# Patient Record
Sex: FEMALE | Race: BLACK OR AFRICAN AMERICAN | NOT HISPANIC OR LATINO
[De-identification: names, ages, dates, MRNs, and addresses within clinical notes are randomized per-mention and may not be internally consistent; named-entity substitution may affect disease eponyms.]

---

## 2018-10-18 ENCOUNTER — APPOINTMENT (OUTPATIENT)
Dept: SPINE | Facility: CLINIC | Age: 63
End: 2018-10-18
Payer: COMMERCIAL

## 2018-10-18 VITALS
WEIGHT: 139 LBS | HEIGHT: 67.5 IN | BODY MASS INDEX: 21.56 KG/M2 | SYSTOLIC BLOOD PRESSURE: 102 MMHG | DIASTOLIC BLOOD PRESSURE: 60 MMHG

## 2018-10-18 DIAGNOSIS — Z87.39 PERSONAL HISTORY OF OTHER DISEASES OF THE MUSCULOSKELETAL SYSTEM AND CONNECTIVE TISSUE: ICD-10-CM

## 2018-10-18 DIAGNOSIS — Z83.3 FAMILY HISTORY OF DIABETES MELLITUS: ICD-10-CM

## 2018-10-18 DIAGNOSIS — Z80.3 FAMILY HISTORY OF MALIGNANT NEOPLASM OF BREAST: ICD-10-CM

## 2018-10-18 DIAGNOSIS — Z80.49 FAMILY HISTORY OF MALIGNANT NEOPLASM OF OTHER GENITAL ORGANS: ICD-10-CM

## 2018-10-18 DIAGNOSIS — M25.78 OSTEOPHYTE, VERTEBRAE: ICD-10-CM

## 2018-10-18 DIAGNOSIS — Z82.49 FAMILY HISTORY OF ISCHEMIC HEART DISEASE AND OTHER DISEASES OF THE CIRCULATORY SYSTEM: ICD-10-CM

## 2018-10-18 PROCEDURE — 99203 OFFICE O/P NEW LOW 30 MIN: CPT

## 2018-10-18 RX ORDER — METOPROLOL SUCCINATE 200 MG/1
TABLET, EXTENDED RELEASE ORAL
Refills: 0 | Status: ACTIVE | COMMUNITY

## 2018-10-18 RX ORDER — ASPIRIN 81 MG
81 TABLET, DELAYED RELEASE (ENTERIC COATED) ORAL
Refills: 0 | Status: ACTIVE | COMMUNITY

## 2019-10-24 ENCOUNTER — APPOINTMENT (OUTPATIENT)
Dept: OTOLARYNGOLOGY | Facility: CLINIC | Age: 64
End: 2019-10-24
Payer: MEDICAID

## 2019-10-24 ENCOUNTER — OUTPATIENT (OUTPATIENT)
Dept: OUTPATIENT SERVICES | Facility: HOSPITAL | Age: 64
LOS: 1 days | Discharge: HOME | End: 2019-10-24

## 2019-10-24 VITALS
DIASTOLIC BLOOD PRESSURE: 71 MMHG | HEIGHT: 67.5 IN | WEIGHT: 185 LBS | BODY MASS INDEX: 28.7 KG/M2 | SYSTOLIC BLOOD PRESSURE: 113 MMHG | HEART RATE: 101 BPM

## 2019-10-24 DIAGNOSIS — Z00.00 ENCOUNTER FOR GENERAL ADULT MEDICAL EXAMINATION W/OUT ABNORMAL FINDINGS: ICD-10-CM

## 2019-10-24 DIAGNOSIS — Z87.891 PERSONAL HISTORY OF NICOTINE DEPENDENCE: ICD-10-CM

## 2019-10-24 DIAGNOSIS — Z87.09 PERSONAL HISTORY OF OTHER DISEASES OF THE RESPIRATORY SYSTEM: ICD-10-CM

## 2019-10-24 PROCEDURE — 92557 COMPREHENSIVE HEARING TEST: CPT

## 2019-10-24 PROCEDURE — 92567 TYMPANOMETRY: CPT

## 2019-10-24 PROCEDURE — 99204 OFFICE O/P NEW MOD 45 MIN: CPT | Mod: 25

## 2019-10-24 PROCEDURE — G0268 REMOVAL OF IMPACTED WAX MD: CPT

## 2019-10-24 RX ORDER — OMEPRAZOLE 20 MG/1
TABLET, DELAYED RELEASE ORAL
Refills: 0 | Status: ACTIVE | COMMUNITY

## 2019-10-24 RX ORDER — CHOLECALCIFEROL (VITAMIN D3) 25 MCG
TABLET ORAL
Refills: 0 | Status: ACTIVE | COMMUNITY

## 2019-10-24 RX ORDER — PNV NO.95/FERROUS FUM/FOLIC AC 28MG-0.8MG
TABLET ORAL
Refills: 0 | Status: ACTIVE | COMMUNITY

## 2019-10-24 RX ORDER — CHROMIUM 200 MCG
TABLET ORAL
Refills: 0 | Status: ACTIVE | COMMUNITY

## 2019-10-24 RX ORDER — ATORVASTATIN CALCIUM 80 MG/1
TABLET, FILM COATED ORAL
Refills: 0 | Status: DISCONTINUED | COMMUNITY
End: 2019-10-24

## 2019-10-24 NOTE — REASON FOR VISIT
[Initial Consultation] : an initial consultation for [Spouse] : spouse [FreeTextEntry2] : referred by an ENT MD from [facility] for bilateral otalgia, throbbing in both ears

## 2019-10-24 NOTE — HISTORY OF PRESENT ILLNESS
[de-identified] : 64 year old female referred by an ENT MD from [facility] for bilateral otalgia, throbbing in both ears.  States the right ear is worse than the left.  Feels like a pin sticking in it.  States has had the otalgia for the past 2 years, occurring only at night.  States has had intermittent dizziness for the past 3 months.  Patient denies otorrhea, ear infections, hearing loss, vertigo, headaches related to hearing.  States has had a sore throat mainly at night, for the past 2-3 years.   States dysphagia with saliva.  States a little odynophagia at night.  States every now and then has a roughness to her voice.  Taking omeprazole daily for the past 2 years.  \par

## 2019-10-24 NOTE — PHYSICAL EXAM
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

## 2019-10-24 NOTE — PROCEDURE
[FreeTextEntry3] : Procedure note:  Bilateral cerumenectomy\par \par Oct 24, 2019 \par \par Description of Procedure:   Bilateral cerumen impactions were noted requiring debridement under the operating microscope using otologic instrumentation.  The patient tolerated the procedure without complications.\par \par

## 2019-10-31 DIAGNOSIS — D44.7 NEOPLASM OF UNCERTAIN BEHAVIOR OF AORTIC BODY AND OTHER PARAGANGLIA: ICD-10-CM

## 2019-10-31 DIAGNOSIS — D23.21 OTHER BENIGN NEOPLASM OF SKIN OF RIGHT EAR AND EXTERNAL AURICULAR CANAL: ICD-10-CM

## 2019-10-31 DIAGNOSIS — H90.3 SENSORINEURAL HEARING LOSS, BILATERAL: ICD-10-CM

## 2019-10-31 DIAGNOSIS — H61.23 IMPACTED CERUMEN, BILATERAL: ICD-10-CM

## 2020-05-30 ENCOUNTER — APPOINTMENT (OUTPATIENT)
Dept: OTOLARYNGOLOGY | Facility: CLINIC | Age: 65
End: 2020-05-30
Payer: MEDICARE

## 2020-05-30 ENCOUNTER — OUTPATIENT (OUTPATIENT)
Dept: OUTPATIENT SERVICES | Facility: HOSPITAL | Age: 65
LOS: 1 days | Discharge: HOME | End: 2020-05-30

## 2020-05-30 VITALS
HEART RATE: 77 BPM | SYSTOLIC BLOOD PRESSURE: 110 MMHG | TEMPERATURE: 98.6 F | DIASTOLIC BLOOD PRESSURE: 68 MMHG | HEIGHT: 67.5 IN

## 2020-05-30 PROCEDURE — 92567 TYMPANOMETRY: CPT

## 2020-05-30 PROCEDURE — 92557 COMPREHENSIVE HEARING TEST: CPT

## 2020-05-30 PROCEDURE — 99214 OFFICE O/P EST MOD 30 MIN: CPT | Mod: 25

## 2020-05-30 PROCEDURE — 92504 EAR MICROSCOPY EXAMINATION: CPT

## 2020-05-30 RX ORDER — ESTRADIOL 0.1 MG/G
0.1 CREAM VAGINAL
Qty: 42 | Refills: 0 | Status: ACTIVE | COMMUNITY
Start: 2020-05-19

## 2020-05-30 RX ORDER — FLUTICASONE PROPIONATE 50 UG/1
50 SPRAY, METERED NASAL
Qty: 16 | Refills: 0 | Status: ACTIVE | COMMUNITY
Start: 2020-03-16

## 2020-05-30 RX ORDER — PREDNISONE 5 MG/1
5 TABLET ORAL
Qty: 30 | Refills: 0 | Status: ACTIVE | COMMUNITY
Start: 2020-05-22

## 2020-05-30 RX ORDER — ALBUTEROL SULFATE 90 UG/1
108 (90 BASE) INHALANT RESPIRATORY (INHALATION)
Qty: 8 | Refills: 0 | Status: ACTIVE | COMMUNITY
Start: 2020-03-17

## 2020-06-01 NOTE — DATA REVIEWED
[de-identified] : I personally reviewed the patient's audiogram, which shows mostly stable symmetric b/l SNHL, type A tymps b/l.\par

## 2020-06-01 NOTE — PHYSICAL EXAM
[de-identified] : narrow but patent b/l [Normal] : external ears are normal bilaterally [de-identified] : AD retrotympanic mass on promontory just anterior to malleus, appears more prominent relative to last visit; L TM intact without effusion/retraction

## 2020-06-01 NOTE — PROCEDURE
[FreeTextEntry3] : Procedure note:  Binocular microscopy\par \par May 30, 2020 \par \par Inspection of the ears was performed under binocular microscopy because of need to accurately visualize otologic landmarks and potential pathologic conditions that would not otherwise be visible through simple otoscopic exam.\par

## 2020-06-01 NOTE — CONSULT LETTER
[FreeTextEntry2] : Dwayne Houser MD [FreeTextEntry1] : Dear Nicola,\par \par Prosper David presents for followup for her right glomus tympanicum. As you may recall, she is a very pleasant 65-year-old woman with a 3 mm right glomus tympanicum. At her last visit with me in October she elected for observation of the tumor given she was mostly asymptomatic. Since then she has noticed an increase in right pulsatile tinnitus. Exam today demonstrates a similar-appearing right glomus tympanicum. Grossly it does appear slightly larger than the previously documented size of 3 mm. Given she now appeared more symptomatic and there is clinical concern for growth, we now discussed surgical removal, and she is interested in proceeding. I plan to obtain a new temporal bone CT to confirm no interval development of bony promontory erosion, and then she will followup after imaging for further discussion regarding the details of surgery.\par \par Thank you once again for the opportunity to participate in your patient's care, and I will keep you updated as to her progress.\par \par Best regards,\par \par Frandy Eden\par Otology/Neurotology\par Department of Otolaryngology\par [facility]\par [facility]\par

## 2020-06-01 NOTE — HISTORY OF PRESENT ILLNESS
[de-identified] : 65 year old female follow up for right glomus tumor and pulsatile tinnitus.  Reports more bothersome right tinnitus over the past 6 months since her last visit, pulsatile in nature, present everyday, fluctuates in severity.  Also reports left ear throbbing pain, mainly at night, usually occurs daily. Reports severe pain leads to a headache. Reports occasional left tinnitus non fluctuating. Seen in Urgent Care 3 months ago, Qtip stuck in left ear, removed and given ear drops. Denies drainage, dizziness or changes in hearing.

## 2020-06-01 NOTE — REASON FOR VISIT
[Subsequent Evaluation] : a subsequent evaluation for [Other: _____] : [unfilled] [FreeTextEntry2] : tinnitus

## 2020-06-09 ENCOUNTER — APPOINTMENT (OUTPATIENT)
Dept: CT IMAGING | Facility: IMAGING CENTER | Age: 65
End: 2020-06-09
Payer: MEDICARE

## 2020-06-09 ENCOUNTER — OUTPATIENT (OUTPATIENT)
Dept: OUTPATIENT SERVICES | Facility: HOSPITAL | Age: 65
LOS: 1 days | End: 2020-06-09
Payer: COMMERCIAL

## 2020-06-09 ENCOUNTER — RESULT REVIEW (OUTPATIENT)
Age: 65
End: 2020-06-09

## 2020-06-09 ENCOUNTER — APPOINTMENT (OUTPATIENT)
Dept: OTOLARYNGOLOGY | Facility: CLINIC | Age: 65
End: 2020-06-09
Payer: MEDICAID

## 2020-06-09 DIAGNOSIS — D23.21 OTHER BENIGN NEOPLASM OF SKIN OF RIGHT EAR AND EXTERNAL AURICULAR CANAL: ICD-10-CM

## 2020-06-09 DIAGNOSIS — D44.7 NEOPLASM OF UNCERTAIN BEHAVIOR OF AORTIC BODY AND OTHER PARAGANGLIA: ICD-10-CM

## 2020-06-09 PROCEDURE — 70480 CT ORBIT/EAR/FOSSA W/O DYE: CPT | Mod: 26

## 2020-06-09 PROCEDURE — ZZZZZ: CPT

## 2020-06-09 PROCEDURE — 70480 CT ORBIT/EAR/FOSSA W/O DYE: CPT

## 2020-06-11 DIAGNOSIS — D23.21 OTHER BENIGN NEOPLASM OF SKIN OF RIGHT EAR AND EXTERNAL AURICULAR CANAL: ICD-10-CM

## 2020-06-11 DIAGNOSIS — H90.3 SENSORINEURAL HEARING LOSS, BILATERAL: ICD-10-CM

## 2020-06-11 DIAGNOSIS — D44.7 NEOPLASM OF UNCERTAIN BEHAVIOR OF AORTIC BODY AND OTHER PARAGANGLIA: ICD-10-CM

## 2020-06-11 DIAGNOSIS — H61.23 IMPACTED CERUMEN, BILATERAL: ICD-10-CM

## 2020-06-18 ENCOUNTER — NON-APPOINTMENT (OUTPATIENT)
Age: 65
End: 2020-06-18

## 2020-06-18 DIAGNOSIS — H92.03 OTALGIA, BILATERAL: ICD-10-CM

## 2020-07-28 ENCOUNTER — OUTPATIENT (OUTPATIENT)
Dept: OUTPATIENT SERVICES | Facility: HOSPITAL | Age: 65
LOS: 1 days | End: 2020-07-28
Payer: MEDICARE

## 2020-07-28 VITALS
RESPIRATION RATE: 18 BRPM | OXYGEN SATURATION: 96 % | SYSTOLIC BLOOD PRESSURE: 90 MMHG | WEIGHT: 145.06 LBS | HEIGHT: 66 IN | HEART RATE: 86 BPM | TEMPERATURE: 98 F | DIASTOLIC BLOOD PRESSURE: 60 MMHG

## 2020-07-28 DIAGNOSIS — G43.909 MIGRAINE, UNSPECIFIED, NOT INTRACTABLE, WITHOUT STATUS MIGRAINOSUS: ICD-10-CM

## 2020-07-28 DIAGNOSIS — H90.3 SENSORINEURAL HEARING LOSS, BILATERAL: ICD-10-CM

## 2020-07-28 DIAGNOSIS — Z98.890 OTHER SPECIFIED POSTPROCEDURAL STATES: Chronic | ICD-10-CM

## 2020-07-28 DIAGNOSIS — D23.9 OTHER BENIGN NEOPLASM OF SKIN, UNSPECIFIED: ICD-10-CM

## 2020-07-28 DIAGNOSIS — Z86.79 PERSONAL HISTORY OF OTHER DISEASES OF THE CIRCULATORY SYSTEM: ICD-10-CM

## 2020-07-28 DIAGNOSIS — Z90.710 ACQUIRED ABSENCE OF BOTH CERVIX AND UTERUS: Chronic | ICD-10-CM

## 2020-07-28 DIAGNOSIS — R06.00 DYSPNEA, UNSPECIFIED: ICD-10-CM

## 2020-07-28 LAB
ANION GAP SERPL CALC-SCNC: 12 MMO/L — SIGNIFICANT CHANGE UP (ref 7–14)
BUN SERPL-MCNC: 15 MG/DL — SIGNIFICANT CHANGE UP (ref 7–23)
CALCIUM SERPL-MCNC: 9.3 MG/DL — SIGNIFICANT CHANGE UP (ref 8.4–10.5)
CHLORIDE SERPL-SCNC: 108 MMOL/L — HIGH (ref 98–107)
CO2 SERPL-SCNC: 21 MMOL/L — LOW (ref 22–31)
CREAT SERPL-MCNC: 0.6 MG/DL — SIGNIFICANT CHANGE UP (ref 0.5–1.3)
GLUCOSE SERPL-MCNC: 81 MG/DL — SIGNIFICANT CHANGE UP (ref 70–99)
HCT VFR BLD CALC: 27.9 % — LOW (ref 34.5–45)
HGB BLD-MCNC: 8.5 G/DL — LOW (ref 11.5–15.5)
MCHC RBC-ENTMCNC: 20.2 PG — LOW (ref 27–34)
MCHC RBC-ENTMCNC: 30.5 % — LOW (ref 32–36)
MCV RBC AUTO: 66.4 FL — LOW (ref 80–100)
NRBC # FLD: 0 K/UL — SIGNIFICANT CHANGE UP (ref 0–0)
PLATELET # BLD AUTO: 466 K/UL — HIGH (ref 150–400)
PMV BLD: 9.8 FL — SIGNIFICANT CHANGE UP (ref 7–13)
POTASSIUM SERPL-MCNC: 3.8 MMOL/L — SIGNIFICANT CHANGE UP (ref 3.5–5.3)
POTASSIUM SERPL-SCNC: 3.8 MMOL/L — SIGNIFICANT CHANGE UP (ref 3.5–5.3)
RBC # BLD: 4.2 M/UL — SIGNIFICANT CHANGE UP (ref 3.8–5.2)
RBC # FLD: 18.4 % — HIGH (ref 10.3–14.5)
SODIUM SERPL-SCNC: 141 MMOL/L — SIGNIFICANT CHANGE UP (ref 135–145)
WBC # BLD: 7.76 K/UL — SIGNIFICANT CHANGE UP (ref 3.8–10.5)
WBC # FLD AUTO: 7.76 K/UL — SIGNIFICANT CHANGE UP (ref 3.8–10.5)

## 2020-07-28 PROCEDURE — 93010 ELECTROCARDIOGRAM REPORT: CPT

## 2020-07-28 NOTE — H&P PST ADULT - NSICDXPASTMEDICALHX_GEN_ALL_CORE_FT
PAST MEDICAL HISTORY:  Atrophic vaginitis     GERD (gastroesophageal reflux disease)     History of cardiac arrhythmia     History of hypotension     Migraines     Osteoporosis     Seasonal allergies

## 2020-07-28 NOTE — H&P PST ADULT - ASSESSMENT
64 yo female with history of OA, osteoporosis presents to PST unit with pre-op diagnosis of other benign neoplasm of skin of right ear and external auricular canal scheduled for tympanoplasty, removal of glomus tympanicum, tissue graft with Dr. Eden on 08/05/2020.

## 2020-07-28 NOTE — H&P PST ADULT - NSICDXPROBLEM_GEN_ALL_CORE_FT
PROBLEM DIAGNOSES  Problem: Other benign neoplasm of skin  Assessment and Plan: scheduled for tympanoplasty, removal of glomus tympanicum, tissue graft with Dr. Eden on 08/05/2020.  Verbal and written pre-op instructions provided to patient. Patient verbalized understanding and will call surgeons office for revised instructions if surgery is rescheduled.   Pepcid for GI prophylaxis provided.   Patient given phone number [phone]  to schedule COVID testing within 72 hours of procedure.     Problem: Migraines  Assessment and Plan: Pt. instructed to continue medications as prescribed.     Problem: History of cardiac arrhythmia  Assessment and Plan: Pt. instructed to continue medications as prescribed.     Problem: KEITA (dyspnea on exertion)  Assessment and Plan: Requesting Cardiac eval -copy requested.

## 2020-07-28 NOTE — H&P PST ADULT - NSANTHOSAYNRD_GEN_A_CORE
No. TRACEE screening performed.  STOP BANG Legend: 0-2 = LOW Risk; 3-4 = INTERMEDIATE Risk; 5-8 = HIGH Risk

## 2020-07-28 NOTE — H&P PST ADULT - HISTORY OF PRESENT ILLNESS
66 yo female with history of OA, osteoporosis presents to PST unit with pre-op diagnosis of other benign neoplasm of skin of right ear and external auricular canal scheduled for tympanoplasty, removal of glomus tympanicum, tissue graft with Dr. Eden on 08/05/2020. She reports tinnitus in right ear over two years which prompted ENT evaluation and found to have enlarging benign tumor in right ear.     ** Pt is poor historian**

## 2020-07-28 NOTE — H&P PST ADULT - ENMT COMMENTS
occasional pinching sensation/ pain in left ear, pre-op diagnosis other benign neoplasm of skin of right ear and external auricular canal pre-op diagnosis of other benign neoplasm of skin of right ear and external auricular canal

## 2020-07-28 NOTE — H&P PST ADULT - ENT GEN HX ROS MEA POS PC
irritation of throat and occasional difficulty swallowing evaluated by endoscopy -unsure when/tinnitus/dysphagia/ear pain

## 2020-07-28 NOTE — H&P PST ADULT - RS GEN PE MLT RESP DETAILS PC
respirations non-labored/breath sounds equal/airway patent/clear to auscultation bilaterally/no wheezes/good air movement

## 2020-08-01 DIAGNOSIS — Z01.818 ENCOUNTER FOR OTHER PREPROCEDURAL EXAMINATION: ICD-10-CM

## 2020-08-02 ENCOUNTER — APPOINTMENT (OUTPATIENT)
Dept: DISASTER EMERGENCY | Facility: CLINIC | Age: 65
End: 2020-08-02

## 2020-08-03 LAB — SARS-COV-2 N GENE NPH QL NAA+PROBE: NOT DETECTED

## 2020-08-04 ENCOUNTER — TRANSCRIPTION ENCOUNTER (OUTPATIENT)
Age: 65
End: 2020-08-04

## 2020-08-05 ENCOUNTER — OUTPATIENT (OUTPATIENT)
Dept: OUTPATIENT SERVICES | Facility: HOSPITAL | Age: 65
LOS: 1 days | Discharge: HOME | End: 2020-08-05
Payer: MEDICARE

## 2020-08-05 ENCOUNTER — RESULT REVIEW (OUTPATIENT)
Age: 65
End: 2020-08-05

## 2020-08-05 ENCOUNTER — APPOINTMENT (OUTPATIENT)
Dept: OTOLARYNGOLOGY | Facility: AMBULATORY SURGERY CENTER | Age: 65
End: 2020-08-05

## 2020-08-05 VITALS
RESPIRATION RATE: 18 BRPM | HEIGHT: 66 IN | TEMPERATURE: 98 F | WEIGHT: 145.06 LBS | HEART RATE: 80 BPM | DIASTOLIC BLOOD PRESSURE: 50 MMHG | OXYGEN SATURATION: 96 % | SYSTOLIC BLOOD PRESSURE: 110 MMHG

## 2020-08-05 VITALS
SYSTOLIC BLOOD PRESSURE: 121 MMHG | TEMPERATURE: 98 F | DIASTOLIC BLOOD PRESSURE: 65 MMHG | RESPIRATION RATE: 14 BRPM | HEART RATE: 70 BPM

## 2020-08-05 DIAGNOSIS — H90.3 SENSORINEURAL HEARING LOSS, BILATERAL: ICD-10-CM

## 2020-08-05 DIAGNOSIS — Z98.890 OTHER SPECIFIED POSTPROCEDURAL STATES: Chronic | ICD-10-CM

## 2020-08-05 DIAGNOSIS — Z90.710 ACQUIRED ABSENCE OF BOTH CERVIX AND UTERUS: Chronic | ICD-10-CM

## 2020-08-05 PROCEDURE — 88341 IMHCHEM/IMCYTCHM EA ADD ANTB: CPT | Mod: 26

## 2020-08-05 PROCEDURE — 69440 EXPLORATION OF MIDDLE EAR: CPT | Mod: 59

## 2020-08-05 PROCEDURE — 69550 EXC AURL GLOMUS TUM TRNSCANL: CPT

## 2020-08-05 PROCEDURE — 88305 TISSUE EXAM BY PATHOLOGIST: CPT | Mod: 26

## 2020-08-05 PROCEDURE — 88342 IMHCHEM/IMCYTCHM 1ST ANTB: CPT | Mod: 26

## 2020-08-05 RX ORDER — HYDROCODONE BITARTRATE AND ACETAMINOPHEN 5; 325 MG/1; MG/1
5-325 TABLET ORAL
Qty: 10 | Refills: 0 | Status: ACTIVE | COMMUNITY
Start: 2020-08-05 | End: 1900-01-01

## 2020-08-05 RX ORDER — TOPIRAMATE 25 MG
1 TABLET ORAL
Qty: 0 | Refills: 0 | DISCHARGE

## 2020-08-05 RX ORDER — ASPIRIN/CALCIUM CARB/MAGNESIUM 324 MG
1 TABLET ORAL
Qty: 0 | Refills: 0 | DISCHARGE
End: 2020-07-28

## 2020-08-05 RX ORDER — FLUTICASONE PROPIONATE 50 MCG
1 SPRAY, SUSPENSION NASAL
Qty: 0 | Refills: 0 | DISCHARGE

## 2020-08-05 RX ORDER — NITROFURANTOIN MACROCRYSTAL 50 MG
1 CAPSULE ORAL
Qty: 0 | Refills: 0 | DISCHARGE

## 2020-08-05 RX ORDER — FOLIC ACID 0.8 MG
1 TABLET ORAL
Qty: 0 | Refills: 0 | DISCHARGE

## 2020-08-05 RX ORDER — METOPROLOL TARTRATE 50 MG
1 TABLET ORAL
Qty: 0 | Refills: 0 | DISCHARGE

## 2020-08-05 RX ORDER — OXYCODONE AND ACETAMINOPHEN 5; 325 MG/1; MG/1
1 TABLET ORAL ONCE
Refills: 0 | Status: DISCONTINUED | OUTPATIENT
Start: 2020-08-05 | End: 2020-08-05

## 2020-08-05 RX ORDER — ALBUTEROL 90 UG/1
1 AEROSOL, METERED ORAL
Qty: 0 | Refills: 0 | DISCHARGE

## 2020-08-07 PROBLEM — M81.0 AGE-RELATED OSTEOPOROSIS WITHOUT CURRENT PATHOLOGICAL FRACTURE: Chronic | Status: ACTIVE | Noted: 2020-07-28

## 2020-08-07 PROBLEM — Z86.79 PERSONAL HISTORY OF OTHER DISEASES OF THE CIRCULATORY SYSTEM: Chronic | Status: ACTIVE | Noted: 2020-07-28

## 2020-08-07 PROBLEM — K21.9 GASTRO-ESOPHAGEAL REFLUX DISEASE WITHOUT ESOPHAGITIS: Chronic | Status: ACTIVE | Noted: 2020-07-28

## 2020-08-07 PROBLEM — G43.909 MIGRAINE, UNSPECIFIED, NOT INTRACTABLE, WITHOUT STATUS MIGRAINOSUS: Chronic | Status: ACTIVE | Noted: 2020-07-28

## 2020-08-07 PROBLEM — N95.2 POSTMENOPAUSAL ATROPHIC VAGINITIS: Chronic | Status: ACTIVE | Noted: 2020-07-28

## 2020-08-07 PROBLEM — J30.2 OTHER SEASONAL ALLERGIC RHINITIS: Chronic | Status: ACTIVE | Noted: 2020-07-28

## 2020-08-13 ENCOUNTER — APPOINTMENT (OUTPATIENT)
Dept: OTOLARYNGOLOGY | Facility: CLINIC | Age: 65
End: 2020-08-13
Payer: MEDICARE

## 2020-08-13 LAB — SURGICAL PATHOLOGY STUDY: SIGNIFICANT CHANGE UP

## 2020-08-13 PROCEDURE — 99024 POSTOP FOLLOW-UP VISIT: CPT

## 2020-08-13 NOTE — PHYSICAL EXAM
[Binocular Microscopic Exam] : Binocular microscopic exam was performed [Rinne Test Air Conduction Persists > Bone Conduction Right] : air conduction greater than bone conduction on the right [Rinne Test Air Conduction Persists > Bone Conduction Left] : air conduction greater than bone conduction on the left [Tiwari Test Lateralizes To Right] : tone lateralization to the right [Normal] : no rashes [FreeTextEntry6] : post auricular incision healing well [FreeTextEntry8] : gel foam in place [de-identified] : unable to see secondary to gel foam

## 2020-08-13 NOTE — REASON FOR VISIT
[Subsequent Evaluation] : a subsequent evaluation for [FreeTextEntry2] : s/p Right middle ear exploration, right canalplasty,  right removal of glomus tympanicum, 08/05/2020.

## 2020-08-13 NOTE — CONSULT LETTER
[Dear  ___] : Dear  [unfilled], [Consult Letter:] : I had the pleasure of evaluating your patient, [unfilled]. [Sincerely,] : Sincerely, [Consult Closing:] : Thank you very much for allowing me to participate in the care of this patient.  If you have any questions, please do not hesitate to contact me. [Please see my note below.] : Please see my note below. [FreeTextEntry2] : Dwayne Houser MD [FreeTextEntry1] : Dear Nicola,\par \par Ms. David presents for followup.  She is now one week status post right glomus tympanicum removal.  We were able to obtain complete removal of the tumor.  Since surgery she's been doing well.  \par \par Physical exam today shows normal bilateral facial nerve function, a healing postauricular incision, and packing in the medial ear canal.  Tiwari lateralized to the right and Rinne is positive in the right ear.  I asked that she continue drops for another 2 weeks and we will see her back in one month.\par \par Thank you once again for the opportunity to participate in your patient's care, and I will keep you informed as to her progress.\par \par Best regards,\par \par Frandy Eden MD\par Otology/Neurotology\par [facility]\par [facility]\par  [FreeTextEntry3] : Dr. Kamaljit Eden\par Otology/Neurotology\par [facility] \par [facility]\par

## 2020-08-13 NOTE — HISTORY OF PRESENT ILLNESS
[de-identified] : 65 year female s/p Right middle ear exploration, right canalplasty, right removal of glomus tympanicum, 08/05/2020.  Pt. reports slight intermittent dizziness after the surgery.  Pt. denies otorrhea or otalgia.

## 2020-09-11 ENCOUNTER — APPOINTMENT (OUTPATIENT)
Dept: OTOLARYNGOLOGY | Facility: CLINIC | Age: 65
End: 2020-09-11
Payer: MEDICARE

## 2020-09-11 VITALS
RESPIRATION RATE: 14 BRPM | DIASTOLIC BLOOD PRESSURE: 57 MMHG | SYSTOLIC BLOOD PRESSURE: 88 MMHG | HEART RATE: 85 BPM | TEMPERATURE: 98.9 F

## 2020-09-11 PROCEDURE — 99024 POSTOP FOLLOW-UP VISIT: CPT

## 2020-09-11 NOTE — HISTORY OF PRESENT ILLNESS
[de-identified] : Pt. doing well, still using drops\par denies dizziness\par hearing well\par taste still off \par having generalized fatigue

## 2020-09-11 NOTE — PHYSICAL EXAM
[Binocular Microscopic Exam] : Binocular microscopic exam was performed [Normal] : no rashes [FreeTextEntry6] : well healed post-auricular incision.   [FreeTextEntry8] : gelfoam  [de-identified] : intact [de-identified] : unable to see through thick TM, Tiwari midline, Rinne strongly positive on right [FreeTextEntry2] : Facial nerve function is House Brackmann 1/6 in right ear and 1/6 in left ear.

## 2020-09-11 NOTE — CONSULT LETTER
[FreeTextEntry2] : Nicola Houser MD [FreeTextEntry1] : Dear Nicola,\par \par Ms. David presents for her second postop visit 1 months status post right glomus tympanicum removal.  Since her last visit she has been doing well with improvement in hearing and no pain or dizziness. Exam today shows a well healed right postauricular incision and intact eardrum without sign of residual tumor.  Tiwari is midline and Rinne is strongly positive in the right ear.  She will maintain dry ear precautions and I will see her back in 2 months with a postop audio.\par \par Thank you once again for the opportunity to participate in your patient's care, and I will keep you informed as to her progress.\par \par Best regards,\par \par Frandy Eden MD\par Otology/Neurotology\par [facility]\par [facility]\par

## 2020-09-11 NOTE — PROCEDURE
[FreeTextEntry3] : Procedure note:  Binocular microscopy\par \par Sep 11, 2020 \par \par Inspection of the ears was performed under binocular microscopy because of need to accurately visualize otologic landmarks and potential pathologic conditions that would not otherwise be visible through simple otoscopic exam.\par

## 2020-12-21 PROBLEM — Z87.09 HISTORY OF SORE THROAT: Status: RESOLVED | Noted: 2019-10-24 | Resolved: 2020-12-21

## 2021-02-10 ENCOUNTER — OUTPATIENT (OUTPATIENT)
Dept: OUTPATIENT SERVICES | Facility: HOSPITAL | Age: 66
LOS: 1 days | Discharge: HOME | End: 2021-02-10

## 2021-02-10 ENCOUNTER — APPOINTMENT (OUTPATIENT)
Dept: OTOLARYNGOLOGY | Facility: CLINIC | Age: 66
End: 2021-02-10
Payer: MEDICARE

## 2021-02-10 DIAGNOSIS — Z98.890 OTHER SPECIFIED POSTPROCEDURAL STATES: Chronic | ICD-10-CM

## 2021-02-10 DIAGNOSIS — Z90.710 ACQUIRED ABSENCE OF BOTH CERVIX AND UTERUS: Chronic | ICD-10-CM

## 2021-02-10 PROCEDURE — G0268 REMOVAL OF IMPACTED WAX MD: CPT

## 2021-02-10 PROCEDURE — 92567 TYMPANOMETRY: CPT

## 2021-02-10 PROCEDURE — 92557 COMPREHENSIVE HEARING TEST: CPT

## 2021-02-10 PROCEDURE — 99213 OFFICE O/P EST LOW 20 MIN: CPT | Mod: 25

## 2021-02-10 PROCEDURE — 99072 ADDL SUPL MATRL&STAF TM PHE: CPT

## 2021-02-10 RX ORDER — CEPHALEXIN 500 MG/1
500 CAPSULE ORAL 4 TIMES DAILY
Qty: 28 | Refills: 0 | Status: DISCONTINUED | COMMUNITY
Start: 2020-08-05 | End: 2021-02-10

## 2021-02-10 RX ORDER — OFLOXACIN OTIC 3 MG/ML
0.3 SOLUTION AURICULAR (OTIC)
Qty: 1 | Refills: 1 | Status: DISCONTINUED | COMMUNITY
Start: 2020-08-05 | End: 2021-02-10

## 2021-02-10 NOTE — CONSULT LETTER
[FreeTextEntry2] : Nicola Houser MD [FreeTextEntry1] : Dear Nicola,\par \par Tawanna David presents for follow-up 6 months status post right glomus tympanicum removal. she has had no new ear symptoms since her last visit in September 2020.  Otoscopic exam today shows an intact right tympanic membrane without evidence of recurrent glomus, and a postoperative audiogram shows stable symmetric hearing thresholds relative to her preoperative audiogram.  Overall she is pleased with the absence of tumor regrowth and stable hearing.  I will see her back in the summer months, and at that time we will obtain an MRI to confirm no glomus regrowth.\par \par Thank you once again for the opportunity to participate in your patient's care, and I will keep you informed as to her progress.\par \par Best regards,\par \par Frandy Eden MD\par Otology/Neurotology\par [facility]\par [facility]

## 2021-02-10 NOTE — REASON FOR VISIT
[Subsequent Evaluation] : a subsequent evaluation for [Other: _____] : [unfilled] [FreeTextEntry2] : s/p Right middle ear exploration, right canalplasty,  right removal of glomus tympanicum, 08/05/2020

## 2021-02-10 NOTE — HISTORY OF PRESENT ILLNESS
[de-identified] : 65 year old female s/p Right middle ear exploration, right canalplasty,  right removal of glomus tympanicum, 08/05/2020. Reports intermittent sharp otalgia and bilateral clogged ears.  Reports hearing has been stable. Reports intermittent bilateral tinnitus, right is worse than the left.  Denies otorrhea, recent ear infections.

## 2021-03-02 DIAGNOSIS — D23.21 OTHER BENIGN NEOPLASM OF SKIN OF RIGHT EAR AND EXTERNAL AURICULAR CANAL: ICD-10-CM

## 2021-03-02 DIAGNOSIS — D44.7 NEOPLASM OF UNCERTAIN BEHAVIOR OF AORTIC BODY AND OTHER PARAGANGLIA: ICD-10-CM

## 2021-03-02 DIAGNOSIS — H61.23 IMPACTED CERUMEN, BILATERAL: ICD-10-CM

## 2021-03-02 DIAGNOSIS — H90.3 SENSORINEURAL HEARING LOSS, BILATERAL: ICD-10-CM

## 2021-03-08 ENCOUNTER — APPOINTMENT (OUTPATIENT)
Dept: OTOLARYNGOLOGY | Facility: CLINIC | Age: 66
End: 2021-03-08
Payer: MEDICARE

## 2021-03-08 VITALS
BODY MASS INDEX: 21.19 KG/M2 | WEIGHT: 135 LBS | DIASTOLIC BLOOD PRESSURE: 71 MMHG | HEART RATE: 80 BPM | SYSTOLIC BLOOD PRESSURE: 109 MMHG | HEIGHT: 67 IN

## 2021-03-08 DIAGNOSIS — R49.0 DYSPHONIA: ICD-10-CM

## 2021-03-08 PROCEDURE — 31575 DIAGNOSTIC LARYNGOSCOPY: CPT

## 2021-03-08 PROCEDURE — 99072 ADDL SUPL MATRL&STAF TM PHE: CPT

## 2021-03-08 PROCEDURE — 99214 OFFICE O/P EST MOD 30 MIN: CPT | Mod: 25

## 2021-03-08 NOTE — PROCEDURE
[de-identified] : Fiberoptic Laryngoscopy (20774)\par \par Procedure performed: Fiberoptic Laryngeal Endoscopy - Diagnostic\par Pre-op/post op indication: Odynophagia\par Patient was unable to cooperate with mirror. After informed verbal consent is obtained, the fiberoptic nasal endoscope # []  is passed via the both nasal cavity. \par Findings: base of tongue and vallecula clear, hypopharynx normal without pooled secretions, crisp epiglottis, no evidence of laryngomalacia, bilateral vocal fold mobility full and symmetric. There was  significant arytenoids edema and  erythema seen , without  mass or lesions.. Vocal folds showed some erythema as well\par

## 2021-03-08 NOTE — REASON FOR VISIT
[Initial Evaluation] : an initial evaluation for [Spouse] : spouse [FreeTextEntry2] : referred by Dr. Eden, for voice hoarseness

## 2021-03-08 NOTE — DATA REVIEWED
[de-identified] : Last audiogram February 0.2 thousand 21 showed a high-frequency sensorineural hearing loss at 6k and 8k

## 2021-03-08 NOTE — CONSULT LETTER
[Dear  ___] : Dear  [unfilled], [Courtesy Letter:] : I had the pleasure of seeing your patient, [unfilled], in my office today. [Please see my note below.] : Please see my note below. [Sincerely,] : Sincerely, [FreeTextEntry3] : Kvng Rodriguez MD, FACS \par  of Otolaryngology  \par [facility] \par [address] \[place] [address] \par Phone: [phone] \par Fax: [phone] \par \par

## 2021-03-08 NOTE — HISTORY OF PRESENT ILLNESS
[de-identified] : 66 year old female referred by Dr. Eden, for voice hoarseness and sore throat with chronic nasal congestion, mostly from Right nostril.  States symptoms present since 2019.  History s/p Right middle ear exploration, right canaloplasty, right removal of glomus tympanicum, 08/05/2020.  Also complaining of noise in the ear not sure which side.

## 2021-03-17 DIAGNOSIS — R49.0 DYSPHONIA: ICD-10-CM

## 2021-03-17 DIAGNOSIS — K22.9 DISEASE OF ESOPHAGUS, UNSPECIFIED: ICD-10-CM

## 2021-06-11 ENCOUNTER — RX RENEWAL (OUTPATIENT)
Age: 66
End: 2021-06-11

## 2021-07-12 ENCOUNTER — RX RENEWAL (OUTPATIENT)
Age: 66
End: 2021-07-12

## 2021-08-13 ENCOUNTER — APPOINTMENT (OUTPATIENT)
Dept: OTOLARYNGOLOGY | Facility: CLINIC | Age: 66
End: 2021-08-13
Payer: MEDICARE

## 2021-08-13 VITALS
DIASTOLIC BLOOD PRESSURE: 70 MMHG | WEIGHT: 163 LBS | HEART RATE: 69 BPM | HEIGHT: 67.5 IN | SYSTOLIC BLOOD PRESSURE: 107 MMHG | BODY MASS INDEX: 25.29 KG/M2

## 2021-08-13 PROCEDURE — 99213 OFFICE O/P EST LOW 20 MIN: CPT | Mod: 25

## 2021-09-17 ENCOUNTER — APPOINTMENT (OUTPATIENT)
Dept: MRI IMAGING | Facility: IMAGING CENTER | Age: 66
End: 2021-09-17

## 2021-10-31 ENCOUNTER — OUTPATIENT (OUTPATIENT)
Dept: OUTPATIENT SERVICES | Facility: HOSPITAL | Age: 66
LOS: 1 days | End: 2021-10-31
Payer: COMMERCIAL

## 2021-10-31 ENCOUNTER — APPOINTMENT (OUTPATIENT)
Dept: MRI IMAGING | Facility: IMAGING CENTER | Age: 66
End: 2021-10-31
Payer: MEDICARE

## 2021-10-31 DIAGNOSIS — H90.3 SENSORINEURAL HEARING LOSS, BILATERAL: ICD-10-CM

## 2021-10-31 DIAGNOSIS — Z90.710 ACQUIRED ABSENCE OF BOTH CERVIX AND UTERUS: Chronic | ICD-10-CM

## 2021-10-31 DIAGNOSIS — Z98.890 OTHER SPECIFIED POSTPROCEDURAL STATES: Chronic | ICD-10-CM

## 2021-10-31 PROCEDURE — 70553 MRI BRAIN STEM W/O & W/DYE: CPT | Mod: 26

## 2021-10-31 PROCEDURE — A9585: CPT

## 2021-10-31 PROCEDURE — 70553 MRI BRAIN STEM W/O & W/DYE: CPT

## 2021-11-01 ENCOUNTER — OUTPATIENT (OUTPATIENT)
Dept: OUTPATIENT SERVICES | Facility: HOSPITAL | Age: 66
LOS: 1 days | Discharge: HOME | End: 2021-11-01

## 2021-11-01 ENCOUNTER — APPOINTMENT (OUTPATIENT)
Dept: OTOLARYNGOLOGY | Facility: CLINIC | Age: 66
End: 2021-11-01
Payer: MEDICARE

## 2021-11-01 VITALS
HEIGHT: 67.5 IN | BODY MASS INDEX: 28.23 KG/M2 | WEIGHT: 182 LBS | HEART RATE: 73 BPM | SYSTOLIC BLOOD PRESSURE: 119 MMHG | DIASTOLIC BLOOD PRESSURE: 74 MMHG

## 2021-11-01 DIAGNOSIS — Z90.710 ACQUIRED ABSENCE OF BOTH CERVIX AND UTERUS: Chronic | ICD-10-CM

## 2021-11-01 DIAGNOSIS — Z98.890 OTHER SPECIFIED POSTPROCEDURAL STATES: Chronic | ICD-10-CM

## 2021-11-01 DIAGNOSIS — K21.9 GASTRO-ESOPHAGEAL REFLUX DISEASE W/OUT ESOPHAGITIS: ICD-10-CM

## 2021-11-01 PROCEDURE — 31575 DIAGNOSTIC LARYNGOSCOPY: CPT

## 2021-11-01 PROCEDURE — 99213 OFFICE O/P EST LOW 20 MIN: CPT | Mod: 25

## 2021-11-01 RX ORDER — FAMOTIDINE 40 MG/1
40 TABLET, FILM COATED ORAL
Qty: 60 | Refills: 0 | Status: ACTIVE | COMMUNITY
Start: 2021-03-08 | End: 1900-01-01

## 2021-11-01 NOTE — HISTORY OF PRESENT ILLNESS
[de-identified] : 66 year old female referred by Dr. Eden, for voice hoarseness and sore throat. States symptoms present since 2019.  History s/p Right middle ear exploration, right canaloplasty, right removal of glomus tympanicum, 08/05/2020. Patient was scoped last visit 3/2021 which shows LPR was started on PPI and Famotidine with improvement after medication. However in the past 1 month hoarseness has worsen again. Partner feels patient is straining to speak.

## 2021-11-01 NOTE — ASSESSMENT
[FreeTextEntry1] : PEPCID\par GERD INSTRUCTIONS\par DIET\par GARGLING WITH SALT AND WATER\par HUMIDIFIER\par F/U GLOMUS TUMOR WITH DR REDDING\par MRI DONE YESTERDAY AND RESULTS PENDING

## 2021-11-02 NOTE — CONSULT LETTER
[Dear  ___] : Dear  [unfilled], [Consult Letter:] : I had the pleasure of evaluating your patient, [unfilled]. [Please see my note below.] : Please see my note below. [Consult Closing:] : Thank you very much for allowing me to participate in the care of this patient.  If you have any questions, please do not hesitate to contact me. [Sincerely,] : Sincerely, [FreeTextEntry2] : Nicola Houser MD  [FreeTextEntry3] : Dr. Kamaljit Eden\par Otology/Neurotology\par [facility] \par [facility]

## 2021-11-02 NOTE — HISTORY OF PRESENT ILLNESS
[de-identified] : 66 year old female follow up  s/p Right middle ear exploration, right canalplasty, right removal of glomus tympanicum, 08/05/2020. Reports concerns that hearing is getting worse from the left ear.  Reports occasional feeling a pinch in the right ear. Reports constant right tinnitus, fluctuating in pitch.  Patient denies otorrhea, recent ear infections,dizziness, vertigo, headaches related to hearing.\par \par

## 2021-11-02 NOTE — REASON FOR VISIT
[Subsequent Evaluation] : a subsequent evaluation for [Spouse] : spouse [FreeTextEntry2] :  s/p Right middle ear exploration, right canalplasty, right removal of glomus tympanicum, 08/05/2020.

## 2021-11-03 ENCOUNTER — NON-APPOINTMENT (OUTPATIENT)
Age: 66
End: 2021-11-03

## 2022-01-04 DIAGNOSIS — D44.7 NEOPLASM OF UNCERTAIN BEHAVIOR OF AORTIC BODY AND OTHER PARAGANGLIA: ICD-10-CM

## 2022-01-04 DIAGNOSIS — K21.9 GASTRO-ESOPHAGEAL REFLUX DISEASE WITHOUT ESOPHAGITIS: ICD-10-CM

## 2022-02-09 ENCOUNTER — APPOINTMENT (OUTPATIENT)
Dept: OTOLARYNGOLOGY | Facility: CLINIC | Age: 67
End: 2022-02-09

## 2022-02-24 ENCOUNTER — APPOINTMENT (OUTPATIENT)
Dept: OTOLARYNGOLOGY | Facility: CLINIC | Age: 67
End: 2022-02-24
Payer: MEDICARE

## 2022-02-24 VITALS
HEIGHT: 67.5 IN | DIASTOLIC BLOOD PRESSURE: 76 MMHG | SYSTOLIC BLOOD PRESSURE: 118 MMHG | WEIGHT: 187.38 LBS | HEART RATE: 69 BPM | BODY MASS INDEX: 29.07 KG/M2

## 2022-02-24 DIAGNOSIS — J38.4 EDEMA OF LARYNX: ICD-10-CM

## 2022-02-24 DIAGNOSIS — M26.609 UNSPECIFIED TEMPOROMANDIBULAR JOINT DISORDER: ICD-10-CM

## 2022-02-24 PROCEDURE — 31575 DIAGNOSTIC LARYNGOSCOPY: CPT

## 2022-02-24 PROCEDURE — 99212 OFFICE O/P EST SF 10 MIN: CPT | Mod: 25

## 2022-02-24 RX ORDER — OMEPRAZOLE 40 MG/1
40 CAPSULE, DELAYED RELEASE ORAL
Qty: 60 | Refills: 0 | Status: DISCONTINUED | COMMUNITY
Start: 2021-03-08 | End: 2022-02-24

## 2022-02-24 RX ORDER — METHYLPREDNISOLONE 4 MG/1
4 TABLET ORAL
Qty: 1 | Refills: 0 | Status: DISCONTINUED | COMMUNITY
Start: 2020-08-05 | End: 2022-02-24

## 2022-02-24 RX ORDER — CALCIUM CARBONATE/VITAMIN D3 500MG-5MCG
500-5 TABLET ORAL
Refills: 0 | Status: ACTIVE | COMMUNITY

## 2022-02-24 RX ORDER — MULTIVITAMIN
TABLET ORAL
Refills: 0 | Status: DISCONTINUED | COMMUNITY
End: 2022-02-24

## 2022-02-24 NOTE — HISTORY OF PRESENT ILLNESS
[de-identified] : 67 year old female here for ear, nose and throat issues. 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 Eden. Patient reports hearing a constant whooshing sound in the ear. Patient reports the whooshing is louder when she is in quiet room. Patient reports intermittent otalgia bilaterally and shortly resolves. Patient reports concerns of hearing loss-last hearing test 2/10/2021. Patient reports sudden headaches and dizziness related to the whooshing sound.  Patient denies otorrhea, recent ear infections, vertigo. She had seen Dr. Eden regarding tinnitus, who reported there was no cure. She reports eating nuts and snacks frequently. \par \par Nose: Patient reports having trouble breathing from the right nostril. Patient reports occasional nasal congestion, sinus pain/pressure. Patient reports using Flonase with temporary relief- states Flonase makes her feel dizzy. Patient reports occasional throat pain and hoarseness. Patient reports occasionally straining to speak and increased hoarsenesswhen speaking for long periods of time.  Denies post nasal drip, nasal discharge. Denies CT scan of sinuses in \par \par Throat: Patient reports intermittent throat pain when she swallows. Patient reports throat pain that resolves with Tylenol and warm tea. Reports occasional aspiration while drinking and difficulty clearing throat. Denies dysphagia, odynophagia, dyspnea, dysphonia, otalgia. She does report globus sensation. Was given omeprazole and famotidine b Dr. Santana. She reports eating spicy foods, often fried oily foods.

## 2022-02-24 NOTE — ASSESSMENT
[FreeTextEntry1] : 67 year old female with LPR. Will continue famotidine and omeprazole. I emphasized dietary changes so that she can wean off medications. \par \par She also has TMJ disorder likely resulting in otalgia. she will have dentures adjusted as she thinks they are not fitting properly. Soft diet, warm compresses.

## 2022-02-24 NOTE — REASON FOR VISIT
[Initial Consultation] : an initial consultation for [FreeTextEntry2] : ear, nose and throat issues

## 2022-02-24 NOTE — PHYSICAL EXAM
[de-identified] : click [Midline] : trachea located in midline position [de-identified] : dentures [Normal] : no rashes

## 2022-02-24 NOTE — PROCEDURE
[FreeTextEntry1] : Flexible fiberoptic laryngoscopy [FreeTextEntry2] : Nasal congestion and throat discomfort [FreeTextEntry3] : Procedure: Flexible fiberoptic laryngoscopy\par \par Pre-operative diagnosis: \par \par Indication: unable to tolerate mirror exam\par \par Details:\par After decongestant and lidocaine was sprayed in the bilateral nasal cavities, a flexible laryngoscope was inserted into the right nares. The nasal cavity, middle meatus, nasopharynx, and glottis were visualized. The endoscope was then inserted into the left nares and the nasal cavity was visualized. The patient tolerated procedure well.\par \par Results:\par Right nasal cavity: clear without masses or lesions, clear secretions\par Right inferior turbinate: normal\par Right middle turbinate: normal\par Right middle meatus: normal without masses, pus\par Right ETO: normal\par Left nasal cavity: clear without masses or lesions, clear secretions\par Left inferior turbinate: normal\par Left middle turbinate: normal\par Left middle meatus: normal without masses, pus\par Left ETO: normal\par Nasopharynx: normal without masses or lesions\par Base of tongue: clear\par Vallecula: Clear\par Secretions: normal\par Glottis: Vocal cords mobile and symmetric, piriform sinus clear, normal AE folds, arytenoids\par Normal appearing subglottis. Mild postcricoid edema. \par \par Findings:\par Findings of LPR, mild

## 2022-03-25 ENCOUNTER — APPOINTMENT (OUTPATIENT)
Dept: OTOLARYNGOLOGY | Facility: CLINIC | Age: 67
End: 2022-03-25

## 2022-06-30 ENCOUNTER — APPOINTMENT (OUTPATIENT)
Dept: OTOLARYNGOLOGY | Facility: CLINIC | Age: 67
End: 2022-06-30

## 2022-06-30 VITALS
WEIGHT: 195 LBS | DIASTOLIC BLOOD PRESSURE: 74 MMHG | SYSTOLIC BLOOD PRESSURE: 116 MMHG | TEMPERATURE: 98 F | HEIGHT: 67 IN | BODY MASS INDEX: 30.61 KG/M2 | HEART RATE: 83 BPM

## 2022-06-30 PROCEDURE — 92567 TYMPANOMETRY: CPT

## 2022-06-30 PROCEDURE — 92504 EAR MICROSCOPY EXAMINATION: CPT

## 2022-06-30 PROCEDURE — 92557 COMPREHENSIVE HEARING TEST: CPT

## 2022-06-30 PROCEDURE — 99213 OFFICE O/P EST LOW 20 MIN: CPT | Mod: 25

## 2022-06-30 NOTE — ASSESSMENT
[FreeTextEntry1] : Patient presents for follow-up for right tinnitus and history of right glomus tympanicum removal.   Patient had pre-existing tinnitus in the right ear prior to glomus tympanicum removal, but it has been persistent and somewhat worsened since his surgery.  Otoscopic exam today shows intact bilateral tympanic membranes without effusion or retraction and no sign of recurrent glomus tumor.  Bilateral facial nerve function is normal.  I personally ordered and reviewed a new audiogram for her hearing loss, which shows mostly stable bilateral downsloping high-frequency sensorineural hearing loss.  Speech discrimination is slightly impaired in the right ear at 84%.\par \par Discussed association of tinnitus with hearing loss and how it can worsen with physiologic stressors, including lack of sleep, illness, or emotional stress.  Recommended trial of hearing aid/noise generator combination device for right ear to see if this can reduce tinnitus perception.  New MRI scheduled for October, if no sign of glomus tumor would recommend annual follow-up.

## 2022-08-12 ENCOUNTER — APPOINTMENT (OUTPATIENT)
Dept: MRI IMAGING | Facility: IMAGING CENTER | Age: 67
End: 2022-08-12

## 2022-08-12 ENCOUNTER — OUTPATIENT (OUTPATIENT)
Dept: OUTPATIENT SERVICES | Facility: HOSPITAL | Age: 67
LOS: 1 days | End: 2022-08-12
Payer: COMMERCIAL

## 2022-08-12 DIAGNOSIS — H93.11 TINNITUS, RIGHT EAR: ICD-10-CM

## 2022-08-12 DIAGNOSIS — Z98.890 OTHER SPECIFIED POSTPROCEDURAL STATES: Chronic | ICD-10-CM

## 2022-08-12 DIAGNOSIS — Z90.710 ACQUIRED ABSENCE OF BOTH CERVIX AND UTERUS: Chronic | ICD-10-CM

## 2022-08-12 PROCEDURE — 70553 MRI BRAIN STEM W/O & W/DYE: CPT

## 2022-08-12 PROCEDURE — 70553 MRI BRAIN STEM W/O & W/DYE: CPT | Mod: 26

## 2022-08-12 PROCEDURE — A9585: CPT

## 2022-08-17 ENCOUNTER — NON-APPOINTMENT (OUTPATIENT)
Age: 67
End: 2022-08-17

## 2023-08-14 ENCOUNTER — EMERGENCY (EMERGENCY)
Facility: HOSPITAL | Age: 68
LOS: 1 days | Discharge: HOME | End: 2023-08-14
Attending: EMERGENCY MEDICINE | Admitting: EMERGENCY MEDICINE
Payer: MEDICARE

## 2023-08-14 VITALS
TEMPERATURE: 98 F | SYSTOLIC BLOOD PRESSURE: 102 MMHG | HEART RATE: 78 BPM | RESPIRATION RATE: 18 BRPM | OXYGEN SATURATION: 99 % | DIASTOLIC BLOOD PRESSURE: 60 MMHG

## 2023-08-14 DIAGNOSIS — Z90.710 ACQUIRED ABSENCE OF BOTH CERVIX AND UTERUS: Chronic | ICD-10-CM

## 2023-08-14 DIAGNOSIS — Z98.890 OTHER SPECIFIED POSTPROCEDURAL STATES: Chronic | ICD-10-CM

## 2023-08-14 LAB
ALBUMIN SERPL ELPH-MCNC: 4 G/DL — SIGNIFICANT CHANGE UP (ref 3.3–5)
ALP SERPL-CCNC: 96 U/L — SIGNIFICANT CHANGE UP (ref 40–120)
ALT FLD-CCNC: 20 U/L — SIGNIFICANT CHANGE UP (ref 4–33)
ANION GAP SERPL CALC-SCNC: 11 MMOL/L — SIGNIFICANT CHANGE UP (ref 7–14)
AST SERPL-CCNC: 34 U/L — HIGH (ref 4–32)
BILIRUB SERPL-MCNC: 0.6 MG/DL — SIGNIFICANT CHANGE UP (ref 0.2–1.2)
BUN SERPL-MCNC: 11 MG/DL — SIGNIFICANT CHANGE UP (ref 7–23)
CALCIUM SERPL-MCNC: 9.2 MG/DL — SIGNIFICANT CHANGE UP (ref 8.4–10.5)
CHLORIDE SERPL-SCNC: 105 MMOL/L — SIGNIFICANT CHANGE UP (ref 98–107)
CO2 SERPL-SCNC: 21 MMOL/L — LOW (ref 22–31)
CREAT SERPL-MCNC: 0.65 MG/DL — SIGNIFICANT CHANGE UP (ref 0.5–1.3)
D DIMER BLD IA.RAPID-MCNC: 244 NG/ML DDU — HIGH
EGFR: 96 ML/MIN/1.73M2 — SIGNIFICANT CHANGE UP
GLUCOSE SERPL-MCNC: 129 MG/DL — HIGH (ref 70–99)
HCT VFR BLD CALC: 38.3 % — SIGNIFICANT CHANGE UP (ref 34.5–45)
HGB BLD-MCNC: 11.7 G/DL — SIGNIFICANT CHANGE UP (ref 11.5–15.5)
MCHC RBC-ENTMCNC: 23.9 PG — LOW (ref 27–34)
MCHC RBC-ENTMCNC: 30.5 GM/DL — LOW (ref 32–36)
MCV RBC AUTO: 78.2 FL — LOW (ref 80–100)
NRBC # BLD: 0 /100 WBCS — SIGNIFICANT CHANGE UP (ref 0–0)
NRBC # FLD: 0 K/UL — SIGNIFICANT CHANGE UP (ref 0–0)
PLATELET # BLD AUTO: 244 K/UL — SIGNIFICANT CHANGE UP (ref 150–400)
POTASSIUM SERPL-MCNC: 5.9 MMOL/L — HIGH (ref 3.5–5.3)
POTASSIUM SERPL-SCNC: 5.9 MMOL/L — HIGH (ref 3.5–5.3)
PROT SERPL-MCNC: 7.7 G/DL — SIGNIFICANT CHANGE UP (ref 6–8.3)
RBC # BLD: 4.9 M/UL — SIGNIFICANT CHANGE UP (ref 3.8–5.2)
RBC # FLD: 16.2 % — HIGH (ref 10.3–14.5)
SODIUM SERPL-SCNC: 137 MMOL/L — SIGNIFICANT CHANGE UP (ref 135–145)
TROPONIN T, HIGH SENSITIVITY RESULT: 9 NG/L — SIGNIFICANT CHANGE UP
TSH SERPL-MCNC: 0.8 UIU/ML — SIGNIFICANT CHANGE UP (ref 0.27–4.2)
WBC # BLD: 8.52 K/UL — SIGNIFICANT CHANGE UP (ref 3.8–10.5)
WBC # FLD AUTO: 8.52 K/UL — SIGNIFICANT CHANGE UP (ref 3.8–10.5)

## 2023-08-14 PROCEDURE — 71046 X-RAY EXAM CHEST 2 VIEWS: CPT | Mod: 26

## 2023-08-14 PROCEDURE — 93010 ELECTROCARDIOGRAM REPORT: CPT

## 2023-08-14 PROCEDURE — 99285 EMERGENCY DEPT VISIT HI MDM: CPT

## 2023-08-14 NOTE — ED ADULT NURSE NOTE - NSFALLUNIVINTERV_ED_ALL_ED
Bed/Stretcher in lowest position, wheels locked, appropriate side rails in place/Call bell, personal items and telephone in reach/Instruct patient to call for assistance before getting out of bed/chair/stretcher/Non-slip footwear applied when patient is off stretcher/[place] to call system/Physically safe environment - no spills, clutter or unnecessary equipment/Purposeful proactive rounding/Room/bathroom lighting operational, light cord in reach

## 2023-08-14 NOTE — ED ADULT NURSE NOTE - HIV OFFER
Patient ID: Subha is a 27 year old female.    Chief Complaint   Patient presents with   • Cough     Per patient her  tested positive for covid 10 days ago and started with her symptoms 8 days ago. Patient has a dry cough for 5 days now. Patient for 6 days has lost sense of smell and taste.    • Fever     Patient had a fever yesterday of 100.5 f     • fatigue     tired when walking feels very exhausted    • Shortness of Breath     Patient states she feels like she can not breath well.        New/Established? : Established Patient    Accompanied by:Unaccompanied today    HPI  Sx as above; pt with ESPINO with walking several feet but feels ok at rest.  Has dry cough x 5 days.  Fever 100.5 yesterday, but feels a little better today.  No diarrhea or GI sx.  Feels overall fatigued.      Her  has now recovered from Covid sx.  Has several children at home but they are not sick.      Review of Systems   Constitutional: Positive for activity change, chills, fatigue and fever. Negative for diaphoresis.   HENT: Negative for congestion, ear discharge, ear pain, rhinorrhea, sinus pressure, sinus pain, sneezing, sore throat and trouble swallowing.    Eyes: Negative for pain, discharge and redness.   Respiratory: Positive for cough, chest tightness and shortness of breath. Negative for wheezing.    Cardiovascular: Negative for chest pain.   Gastrointestinal: Negative for diarrhea, nausea and vomiting.   Skin: Negative.  Negative for rash.   Neurological: Negative for dizziness, syncope, light-headedness and headaches.       I have reviewed the past medical history, family history, social history, medications and allergies listed in the medical record as obtained by my nursing staff and support staff and agree with their documentation.    No past medical history on file.  No past surgical history on file.  No family history on file.  Social History     Tobacco Use   • Smoking status: Never Smoker   • Smokeless tobacco:  Never Used   Substance Use Topics   • Alcohol use: Not on file   • Drug use: Not on file        Current Outpatient Medications   Medication Sig Dispense Refill   • olopatadine (PATANOL) 0.1 % ophthalmic solution Place 1 drop into both eyes 2 times daily. 5 mL 12   • desloratadine (CLARINEX) 5 MG tablet Take 1 tablet by mouth daily. 30 tablet 0   • ondansetron (ZOFRAN ODT) 8 MG disintegrating tablet Place 1 tablet onto the tongue every 8 hours as needed for Nausea. 20 tablet 0   • BLISOVI FE 1.5/30 1.5-30 MG-MCG tablet TAKE 1 TABLET BY MOUTH EVERY DAY 28 tablet 0   • ondansetron (ZOFRAN) 4 MG tablet Take 1 tablet by mouth every 8 hours as needed for Nausea. 10 tablet 0   • hydrOXYzine (ATARAX) 10 MG tablet Take 1 tablet by mouth at bedtime as needed for Anxiety. 5 tablet 0     No current facility-administered medications for this visit.      ALLERGIES:  No Known Allergies        Vitals:    05/20/20 1253   BP: 121/85   Pulse: (!) 122   Resp: 18   Temp: 99.6 °F (37.6 °C)   TempSrc: Tympanic   SpO2: 92%     Physical Exam   Constitutional: She appears well-developed and well-nourished. No distress.   HENT:   Head: Normocephalic and atraumatic.   Right Ear: Tympanic membrane, external ear and ear canal normal.   Left Ear: Tympanic membrane, external ear and ear canal normal.   Nose: Nose normal. Right sinus exhibits no maxillary sinus tenderness and no frontal sinus tenderness. Left sinus exhibits no maxillary sinus tenderness and no frontal sinus tenderness.   Mouth/Throat: Uvula is midline, oropharynx is clear and moist and mucous membranes are normal. No oropharyngeal exudate, posterior oropharyngeal edema or posterior oropharyngeal erythema.   Eyes: Pupils are equal, round, and reactive to light. Conjunctivae and EOM are normal. Right eye exhibits no discharge. Left eye exhibits no discharge.   Neck: Neck supple.   Cardiovascular: Normal rate, regular rhythm and normal heart sounds.   No murmur  heard.  Pulmonary/Chest: Effort normal. No respiratory distress. She has no wheezes. She has rales (LLL).   Lymphadenopathy:     She has no cervical adenopathy.   Skin: Skin is warm and dry. No rash noted. She is not diaphoretic.   Nursing note and vitals reviewed.      Results    No results found for this visit on 05/20/20.        ASSESSMENT:  Problem List Items Addressed This Visit     None      Visit Diagnoses     Clinical diagnosis of COVID-19    -  Primary    Relevant Orders    XR CHEST PA AND LATERAL 2 VIEWS    Shortness of breath        Relevant Orders    XR CHEST PA AND LATERAL 2 VIEWS            PLAN:    Orders Placed This Encounter   • XR CHEST PA AND LATERAL 2 VIEWS   Preliminary chest x-ray review shows mild increased interstitial markings with poor inspiratory effort.  No Infiltrate seen.  Await radiology reading.    You have been seen in the immediate care center for a respiratory infection, which is consistent with Covid-19.      Rest, drink plenty of water, and use acetaminophen (Tylenol) products as needed for fever and pain.  Take any prescription medications as directed.    Currently, you should go home and self-isolate from ALL contact with others for 14 days.  You may resume normal activities once your symptoms (including fever) have been gone for 3 days without medication, and it has been at least 14 days since the onset of your symptoms.  Please follow up with primary care as instructed for further management and instructions.    Please go immediately to the nearest emergency department or call 911 with severe shortness of breath or difficulty breathing, or with any severe symptoms you cannot manage at home.      Handouts for testing sites and supportive care for Covid-19 given as well as self-isolation guidelines.    Supportive care discussed with patient and/or guardian(s): rest, fluids, OTC medications.    Contact precautions discussed with patient and/or guardian(s) to minimize  transmission to others.    Emergency room precautions discussed with patient and/or guardian(s).  Go to the emergency room immediately with any worsening or severe symptoms.    D/C instructions per AVS, reviewed with patient and/or guardian(s).    Follow up with PCP in 1-2 days, sooner if worse.    Remington Garg PA-C     Opt out

## 2023-08-14 NOTE — ED ADULT TRIAGE NOTE - CHIEF COMPLAINT QUOTE
Pt presents to ED via EMS from home with c/o palpitations intermittetnly which have since subsided prior to arrival. Pt reports hx of palpitations intermittently just stopped wearing a halter monitor yesterday. Pt denies dizziness, weakness, or shortness of breath.

## 2023-08-14 NOTE — ED PROVIDER NOTE - PROGRESS NOTE DETAILS
Alis: Radiology results unremarkable. Labs: K 5.9 (severely hemolyzed; EKG: no e/o elevated K). Trop = 9 (appropriate for age). Age-adjusted d-dimer = unremarkable.

## 2023-08-14 NOTE — ED PROVIDER NOTE - EKG ADDITIONAL INFORMATION FREE TEXT
Alis: I viewed the EKG myself. My interpretation of the EKG: No hyper-acute T waves, no malignant dysrhythmia, no ischemic ST segment changes.

## 2023-08-14 NOTE — ED PROVIDER NOTE - NSFOLLOWUPINSTRUCTIONS_ED_ALL_ED_FT
You were seen today for palpitations. No dangerous cause was discovered (eg, heart attack, pulmonary embolism).    Follow with Cardiology. Call [phone] and ask for an appointment at a convenient location.     Return if worrisome symptoms recur. You were seen today for palpitations. No dangerous cause was discovered (eg, heart attack, pulmonary embolism).    Follow with Cardiology (such as for your scheduled nuclear stress test), either Dr. Mendelson, or call [phone] and ask for an appointment at a convenient location.     Return if worrisome symptoms recur.

## 2023-08-14 NOTE — ED PROVIDER NOTE - CLINICAL SUMMARY MEDICAL DECISION MAKING FREE TEXT BOX
Alis: Will evaluate for cause of palpitations by checking her hemoglobin, troponin, D-dimer, and TSH.  Check EKG and chest x-ray.  If no obvious cause of palpitations is uncovered today, will refer to [facility] cardiology, as patient is interested in a second opinion.

## 2023-08-14 NOTE — ED ADULT NURSE NOTE - OBJECTIVE STATEMENT
pt is a 68y old female received awake and responsive, c/o of palpitations and chest discomfort for past few days, cardiac monitor is on, vss, pt denies any chest pain or sob @ present

## 2023-08-14 NOTE — ED PROVIDER NOTE - OBJECTIVE STATEMENT
Alis: Patient is seen by cardiologist Robert Mendelson at [facility] for palpitations.  Had 7 days of Holter monitor; did not get a call from them indicating a severe rhythm problem.  Also had a stress test last month that was unremarkable.  Patient says she is scheduled for some additional study with an IV in her arm and some views of her heart; not clear whether that would be a nuclear stress test or an ultrasound stress test or a cardiac catheterization.  I will reach out to [place] cardiology office and am awaiting a phone call back.  Patient says she was relaxing today when she developed palpitations that lasted about 30 minutes and are now resolved. Not likely PNA: no infectious Sx (eg, purulent cough). Not likely PE or other VTE: PERC or Wells low score, EKG no e/o R-heart strain. HEART score low. Past medical history most-notable for RA.  Patient is on metoprolol. Alis: Patient is seen by cardiologist Robert Mendelson at [facility] for palpitations (with whom I spoke).  Had 7 days of Holter monitor that was unremarkable. Also had a stress test last month that was positive. Pt. is hesitant about getting the recommended cath. Is scheduled for a nuclear stress test in ~2 wks and f/u w/ Dr. Mendelson ~2 wks later. Patient says she was relaxing today when she developed palpitations that lasted about 30 minutes and are now resolved. Not likely PNA: no infectious Sx (eg, purulent cough). Not likely PE or other VTE: PERC or Wells low score, EKG no e/o R-heart strain. HEART score low. Past medical history most-notable for RA.  Patient is on metoprolol. Alis: Patient is seen by cardiologist Robert Mendelson at [facility] for palpitations (with whom I spoke).  Just had 7 days of Holter monitor that was unremarkable. Also had a coronary CT that showed narrowing that led to a stress test last month that was positive. Pt. is hesitant about getting the recommended cath. Is scheduled for a nuclear stress test in ~2 wks and f/u w/ Dr. Mendelson ~2 wks later. Patient says she was relaxing today when she developed palpitations that lasted about 30 minutes and are now resolved. Not likely PNA: no infectious Sx (eg, purulent cough). Not likely PE or other VTE: PERC or Wells low score, EKG no e/o R-heart strain. HEART score low. Past medical history most-notable for RA.  Patient is on metoprolol.

## 2023-08-14 NOTE — ED PROVIDER NOTE - PATIENT PORTAL LINK FT
You can access the FollowMyHealth Patient Portal offered by [facility] by registering at the following website: [email or web address]. By joining [facility] [employer] [facility]s FollowMyHealth portal, you will also be able to view your health information using other applications (apps) compatible with our system.

## 2023-09-08 ENCOUNTER — APPOINTMENT (OUTPATIENT)
Dept: OTOLARYNGOLOGY | Facility: CLINIC | Age: 68
End: 2023-09-08
Payer: MEDICARE

## 2023-09-08 DIAGNOSIS — R13.12 DYSPHAGIA, OROPHARYNGEAL PHASE: ICD-10-CM

## 2023-09-08 PROCEDURE — 31575 DIAGNOSTIC LARYNGOSCOPY: CPT

## 2023-09-08 PROCEDURE — 99213 OFFICE O/P EST LOW 20 MIN: CPT | Mod: 25

## 2023-09-08 NOTE — REASON FOR VISIT
[Subsequent Evaluation] : a subsequent evaluation for [FreeTextEntry2] : throat discomfort and globus sensation

## 2023-09-08 NOTE — HISTORY OF PRESENT ILLNESS
[de-identified] :  68 year old female presents for evaluation of throat discomfort and globus sensation. 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 Eden. Reports globus, discomfort with swallow and constant throat soreness. Also notes occasional voice hoarseness and phlegm in the throat. She reports taking omeprazole 40 mg daily. She does not avoid citrus or fatty foods. Does sleep with head of bed elevated and avoids spicy foods. She has not had a recent endoscopy.  She also reports dysphagia and feeling of food getting suck - worse with solids. She also feels constant irritation in the throat.

## 2023-09-08 NOTE — PROCEDURE
[FreeTextEntry1] : Flexible laryngoscopy [FreeTextEntry2] : Sore throat and dysphagia [FreeTextEntry3] : Procedure: Flexible fiberoptic laryngoscopy  Pre-operative diagnosis:   Indication: unable to tolerate mirror exam  Details: After decongestant and lidocaine was sprayed in the bilateral nasal cavities, a flexible laryngoscope was inserted into the right nares. The nasal cavity, middle meatus, nasopharynx, and glottis were visualized. The endoscope was then inserted into the left nares and the nasal cavity was visualized. The patient tolerated procedure well.  Results: Right nasal cavity: clear without masses or lesions, clear secretions Right inferior turbinate: normal Right middle turbinate: normal Right middle meatus: normal without masses, pus Right ETO: normal Left nasal cavity: clear without masses or lesions, clear secretions Left inferior turbinate: normal Left middle turbinate: normal Left middle meatus: normal without masses, pus Left ETO: normal Nasopharynx: normal without masses or lesions Base of tongue: clear Vallecula: Clear Secretions: normal Glottis: Vocal cords mobile and symmetric, piriform sinus clear, normal AE folds, arytenoids Normal appearing subglottis. Post cricoid edema, posterior pharyngeal bulge above arytenoids, non obstructive, mild arytenoid edema

## 2023-09-08 NOTE — ASSESSMENT
[FreeTextEntry1] : 68 year old female with moderate LPR - she will continue omeprazole. I emphasized dietary changes.   She also has dysphagia and on exam she has a posterior pharyngeal bulge - we will obtain MBS.

## 2023-09-13 ENCOUNTER — RESULT REVIEW (OUTPATIENT)
Age: 68
End: 2023-09-13

## 2023-11-30 ENCOUNTER — APPOINTMENT (OUTPATIENT)
Dept: SPEECH THERAPY | Facility: HOSPITAL | Age: 68
End: 2023-11-30
Payer: MEDICAID

## 2023-11-30 ENCOUNTER — NON-APPOINTMENT (OUTPATIENT)
Age: 68
End: 2023-11-30

## 2023-12-06 ENCOUNTER — APPOINTMENT (OUTPATIENT)
Dept: OTOLARYNGOLOGY | Facility: CLINIC | Age: 68
End: 2023-12-06
Payer: MEDICARE

## 2023-12-06 VITALS
BODY MASS INDEX: 24.81 KG/M2 | WEIGHT: 154.38 LBS | DIASTOLIC BLOOD PRESSURE: 68 MMHG | SYSTOLIC BLOOD PRESSURE: 103 MMHG | HEIGHT: 66.14 IN | HEART RATE: 74 BPM

## 2023-12-06 DIAGNOSIS — H92.03 OTALGIA, BILATERAL: ICD-10-CM

## 2023-12-06 DIAGNOSIS — H90.3 SENSORINEURAL HEARING LOSS, BILATERAL: ICD-10-CM

## 2023-12-06 DIAGNOSIS — D18.09 HEMANGIOMA OF OTHER SITES: ICD-10-CM

## 2023-12-06 DIAGNOSIS — H93.11 TINNITUS, RIGHT EAR: ICD-10-CM

## 2023-12-06 DIAGNOSIS — H61.23 IMPACTED CERUMEN, BILATERAL: ICD-10-CM

## 2023-12-06 DIAGNOSIS — D23.21 OTHER BENIGN NEOPLASM OF SKIN OF RIGHT EAR AND EXTERNAL AURICULAR CANAL: ICD-10-CM

## 2023-12-06 PROCEDURE — 99213 OFFICE O/P EST LOW 20 MIN: CPT | Mod: 25

## 2023-12-06 PROCEDURE — 92567 TYMPANOMETRY: CPT

## 2023-12-06 PROCEDURE — 92557 COMPREHENSIVE HEARING TEST: CPT

## 2024-01-21 ENCOUNTER — NON-APPOINTMENT (OUTPATIENT)
Age: 69
End: 2024-01-21

## 2024-01-22 ENCOUNTER — OUTPATIENT (OUTPATIENT)
Dept: OUTPATIENT SERVICES | Facility: HOSPITAL | Age: 69
LOS: 1 days | Discharge: HOME | End: 2024-01-22

## 2024-01-22 ENCOUNTER — APPOINTMENT (OUTPATIENT)
Dept: RADIOLOGY | Facility: HOSPITAL | Age: 69
End: 2024-01-22

## 2024-01-22 ENCOUNTER — APPOINTMENT (OUTPATIENT)
Dept: SPEECH THERAPY | Facility: HOSPITAL | Age: 69
End: 2024-01-22

## 2024-01-22 ENCOUNTER — OUTPATIENT (OUTPATIENT)
Dept: OUTPATIENT SERVICES | Facility: HOSPITAL | Age: 69
LOS: 1 days | End: 2024-01-22

## 2024-01-22 DIAGNOSIS — Z98.890 OTHER SPECIFIED POSTPROCEDURAL STATES: Chronic | ICD-10-CM

## 2024-01-22 DIAGNOSIS — Z90.710 ACQUIRED ABSENCE OF BOTH CERVIX AND UTERUS: Chronic | ICD-10-CM

## 2024-01-22 DIAGNOSIS — R13.12 DYSPHAGIA, OROPHARYNGEAL PHASE: ICD-10-CM

## 2024-01-22 PROCEDURE — 74230 X-RAY XM SWLNG FUNCJ C+: CPT | Mod: 26

## 2024-01-22 NOTE — PLAN
[FreeTextEntry2] : 1.) Regular with Thin Liquids. Thin Liquids via small single cup sip. 2.) Feeding/Swallowing Guidelines: Upright position, small bites, chew well, small single cup; avoid large/consecutive cup sip drinking; two swallows per bite/sip; alternate with a liquid wash after every 2 bites 3.) Aspiration Precautions 4.) Reflux Precautions 5.) Maintain Good Oral Hygiene Care 6.) Follow up with Physician (consider further work up e.g. CXR/CT Neck at MD's discretion given aforementioned above) 7.) Swallow Therapy to maximize swallow mechanism 8.) Consider Neurological Consultation given dysphagia (neurological vs anatomical) at MD's discretion.   SLP provided Patient education regarding preliminary results. Patient verbalized understanding and will follow up with Physician.

## 2024-01-22 NOTE — ASSESSMENT
[FreeTextEntry1] : Patient presents with a Functional Oral and Mild to Moderate Pharyngeal Stage Dysphagia. The Oral Stage is characterized by adequate oral containment, adequate chewing for solid, adequate bolus manipulation, adequate tongue motion with adequate anterior to posterior transfer of the bolus for puree/solids; with adequate oral clearance.  The Pharyngeal Stage is characterized by delayed initiation of the pharyngeal swallow (Bolus head is at the vallecular for Thin Liquids), reduced laryngeal elevation with incomplete laryngeal vestibular closure, reduced tongue base retraction and reduced pharyngeal constriction. There is mild pharyngeal clearance deficits located in the vallecular/lateral pharyngeal wall/pyriforms post primary swallow for solids greater than puree/liquid trials. A reswallow and liquid wash improves pharyngeal clearance.  There was Deep Laryngeal Penetration during the swallow for Thin Liquids to the level of the vocal folds. Patient was not sensate to the Penetration given no reflexive cough response. Patient is verbally cued to cough to clear contrast from the airway.  There was No Aspiration observed before, during or after the swallow for puree, solids, mildly thick liquids. Compensatory strategy of Chin Tuck/Down posture did NOT eliminate the Penetration for Thin Liquids. Small single cup sip eliminated Penetration for Thin Liquids.   RESULTS:  1.) Deep Laryngeal Penetration during the swallow for Thin Liquids to the level of the vocal folds.  2.) No Aspiration observed before, during or after the swallow for puree, solids, mildly thick liquids.  3.) Compensatory strategy of Chin Tuck/Down posture did NOT eliminate the Penetration for Thin Liquids. Small single cup sip did benefit to eliminate Penetration for Thin Liquids.   Of Note: Incidental finding of an Osteophyte located at C3-C4 (as per Radiologist).   Of Note: Patient was given a Puree, Thin liquid and  Barium Tablet in Anterior Posterior view projection. 1.) The Puree bolus revealed unilateral bolus passage via hypopharynx (right side preference) with right sided residue post primary swallow.  2.) An Esophageal Screen was performed. Patient was given a Barium Tablet. The Tablet was observed to course through the pharynx/esophagus without hold up. This cannot be considered as a full complete evaluation of the esophagus.

## 2024-01-22 NOTE — HISTORY OF PRESENT ILLNESS
[FreeTextEntry1] : Patient arrived to Radiology for an OutPatient Modified Barium Swallow Study. Patient was accompanied by her  (Daniel). 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 Eden. Reports globus, discomfort with swallow and constant throat soreness. Also notes occasional voice hoarseness and phlegm in the throat. She reports taking omeprazole 40 mg daily. She does not avoid citrus or fatty foods. Does sleep with head of bed elevated and avoids spicy foods. She has not had a recent endoscopy. She also reports dysphagia and feeling of food getting suck - worse with solids. She also feels constant irritation in the throat. Impression: 68 year old female with moderate LPR - she will continue omeprazole. I emphasized dietary changes. She also has dysphagia and on exam she has a posterior pharyngeal bulge - we will obtain MBS.   Today, Patient offers c/o "right side" points to the neck "sits there" "I press on it" "drink liquids" in attempt to clear the globus sensation. "food and pills" get stuck" x 2 years. Patient reports no current/repeated pneumonia. Patient eats Regular with Thin Liquids.  This Modified Barium Swallow Study is to objectively assess the Physiology of the Oral and Pharyngeal Stage swallowing mechanism for treatment plan for least restrictive diet.   Referring MD: Dr. Salud Rios

## 2024-01-25 DIAGNOSIS — R13.13 DYSPHAGIA, PHARYNGEAL PHASE: ICD-10-CM

## 2024-01-30 ENCOUNTER — NON-APPOINTMENT (OUTPATIENT)
Age: 69
End: 2024-01-30

## 2024-03-08 ENCOUNTER — APPOINTMENT (OUTPATIENT)
Dept: OTOLARYNGOLOGY | Facility: CLINIC | Age: 69
End: 2024-03-08

## 2024-03-17 ENCOUNTER — NON-APPOINTMENT (OUTPATIENT)
Age: 69
End: 2024-03-17

## 2024-07-05 ENCOUNTER — INPATIENT (INPATIENT)
Facility: HOSPITAL | Age: 69
LOS: 3 days | Discharge: HOME | End: 2024-07-09
Attending: STUDENT IN AN ORGANIZED HEALTH CARE EDUCATION/TRAINING PROGRAM | Admitting: STUDENT IN AN ORGANIZED HEALTH CARE EDUCATION/TRAINING PROGRAM
Payer: MEDICARE

## 2024-07-05 VITALS
TEMPERATURE: 98 F | HEART RATE: 64 BPM | SYSTOLIC BLOOD PRESSURE: 107 MMHG | DIASTOLIC BLOOD PRESSURE: 63 MMHG | RESPIRATION RATE: 16 BRPM | WEIGHT: 160.06 LBS | OXYGEN SATURATION: 97 %

## 2024-07-05 DIAGNOSIS — S32.019A UNSPECIFIED FRACTURE OF FIRST LUMBAR VERTEBRA, INITIAL ENCOUNTER FOR CLOSED FRACTURE: ICD-10-CM

## 2024-07-05 DIAGNOSIS — Z90.710 ACQUIRED ABSENCE OF BOTH CERVIX AND UTERUS: Chronic | ICD-10-CM

## 2024-07-05 DIAGNOSIS — Z98.890 OTHER SPECIFIED POSTPROCEDURAL STATES: Chronic | ICD-10-CM

## 2024-07-05 LAB
ANION GAP SERPL CALC-SCNC: 12 MMOL/L — SIGNIFICANT CHANGE UP (ref 7–14)
BASOPHILS # BLD AUTO: 0.06 K/UL — SIGNIFICANT CHANGE UP (ref 0–0.2)
BASOPHILS NFR BLD AUTO: 0.5 % — SIGNIFICANT CHANGE UP (ref 0–2)
BUN SERPL-MCNC: 13 MG/DL — SIGNIFICANT CHANGE UP (ref 7–23)
CALCIUM SERPL-MCNC: 8.8 MG/DL — SIGNIFICANT CHANGE UP (ref 8.4–10.5)
CHLORIDE SERPL-SCNC: 107 MMOL/L — SIGNIFICANT CHANGE UP (ref 98–107)
CO2 SERPL-SCNC: 23 MMOL/L — SIGNIFICANT CHANGE UP (ref 22–31)
CREAT SERPL-MCNC: 0.77 MG/DL — SIGNIFICANT CHANGE UP (ref 0.5–1.3)
EGFR: 83 ML/MIN/1.73M2 — SIGNIFICANT CHANGE UP
EOSINOPHIL # BLD AUTO: 0.11 K/UL — SIGNIFICANT CHANGE UP (ref 0–0.5)
EOSINOPHIL NFR BLD AUTO: 0.9 % — SIGNIFICANT CHANGE UP (ref 0–6)
GLUCOSE SERPL-MCNC: 85 MG/DL — SIGNIFICANT CHANGE UP (ref 70–99)
HCT VFR BLD CALC: 37.9 % — SIGNIFICANT CHANGE UP (ref 34.5–45)
HGB BLD-MCNC: 11.8 G/DL — SIGNIFICANT CHANGE UP (ref 11.5–15.5)
IANC: 8.84 K/UL — HIGH (ref 1.8–7.4)
IMM GRANULOCYTES NFR BLD AUTO: 1.3 % — HIGH (ref 0–0.9)
LYMPHOCYTES # BLD AUTO: 17.9 % — SIGNIFICANT CHANGE UP (ref 13–44)
LYMPHOCYTES # BLD AUTO: 2.16 K/UL — SIGNIFICANT CHANGE UP (ref 1–3.3)
MCHC RBC-ENTMCNC: 23.8 PG — LOW (ref 27–34)
MCHC RBC-ENTMCNC: 31.1 GM/DL — LOW (ref 32–36)
MCV RBC AUTO: 76.6 FL — LOW (ref 80–100)
MONOCYTES # BLD AUTO: 0.72 K/UL — SIGNIFICANT CHANGE UP (ref 0–0.9)
MONOCYTES NFR BLD AUTO: 6 % — SIGNIFICANT CHANGE UP (ref 2–14)
NEUTROPHILS # BLD AUTO: 8.84 K/UL — HIGH (ref 1.8–7.4)
NEUTROPHILS NFR BLD AUTO: 73.4 % — SIGNIFICANT CHANGE UP (ref 43–77)
NRBC # BLD: 0 /100 WBCS — SIGNIFICANT CHANGE UP (ref 0–0)
NRBC # FLD: 0 K/UL — SIGNIFICANT CHANGE UP (ref 0–0)
PLATELET # BLD AUTO: 216 K/UL — SIGNIFICANT CHANGE UP (ref 150–400)
POTASSIUM SERPL-MCNC: 4.2 MMOL/L — SIGNIFICANT CHANGE UP (ref 3.5–5.3)
POTASSIUM SERPL-SCNC: 4.2 MMOL/L — SIGNIFICANT CHANGE UP (ref 3.5–5.3)
RBC # BLD: 4.95 M/UL — SIGNIFICANT CHANGE UP (ref 3.8–5.2)
RBC # FLD: 16.6 % — HIGH (ref 10.3–14.5)
SODIUM SERPL-SCNC: 142 MMOL/L — SIGNIFICANT CHANGE UP (ref 135–145)
WBC # BLD: 12.05 K/UL — HIGH (ref 3.8–10.5)
WBC # FLD AUTO: 12.05 K/UL — HIGH (ref 3.8–10.5)

## 2024-07-05 PROCEDURE — 70450 CT HEAD/BRAIN W/O DYE: CPT | Mod: 26,MC

## 2024-07-05 PROCEDURE — 72170 X-RAY EXAM OF PELVIS: CPT | Mod: 26

## 2024-07-05 PROCEDURE — 99285 EMERGENCY DEPT VISIT HI MDM: CPT

## 2024-07-05 PROCEDURE — 73620 X-RAY EXAM OF FOOT: CPT | Mod: 26,RT

## 2024-07-05 PROCEDURE — 72129 CT CHEST SPINE W/DYE: CPT | Mod: 26,MC

## 2024-07-05 PROCEDURE — 74177 CT ABD & PELVIS W/CONTRAST: CPT | Mod: 26,MC

## 2024-07-05 PROCEDURE — 71260 CT THORAX DX C+: CPT | Mod: 26,MC

## 2024-07-05 PROCEDURE — 73610 X-RAY EXAM OF ANKLE: CPT | Mod: 26,RT

## 2024-07-05 PROCEDURE — 72132 CT LUMBAR SPINE W/DYE: CPT | Mod: 26,MC

## 2024-07-05 RX ORDER — METOPROLOL TARTRATE 50 MG
25 TABLET ORAL
Refills: 0 | Status: DISCONTINUED | OUTPATIENT
Start: 2024-07-05 | End: 2024-07-09

## 2024-07-05 RX ORDER — MONTELUKAST SODIUM 10 MG/1
10 TABLET, FILM COATED ORAL
Refills: 0 | Status: DISCONTINUED | OUTPATIENT
Start: 2024-07-05 | End: 2024-07-09

## 2024-07-05 RX ORDER — MAGNESIUM, ALUMINUM HYDROXIDE 400-400
30 TABLET,CHEWABLE ORAL EVERY 4 HOURS
Refills: 0 | Status: DISCONTINUED | OUTPATIENT
Start: 2024-07-05 | End: 2024-07-09

## 2024-07-05 RX ORDER — FAMOTIDINE 40 MG
1 TABLET ORAL
Refills: 0 | DISCHARGE

## 2024-07-05 RX ORDER — OXYCODONE HYDROCHLORIDE 100 MG/5ML
2.5 SOLUTION ORAL EVERY 6 HOURS
Refills: 0 | Status: DISCONTINUED | OUTPATIENT
Start: 2024-07-05 | End: 2024-07-06

## 2024-07-05 RX ORDER — FAMOTIDINE 40 MG
40 TABLET ORAL AT BEDTIME
Refills: 0 | Status: DISCONTINUED | OUTPATIENT
Start: 2024-07-05 | End: 2024-07-09

## 2024-07-05 RX ORDER — PANTOPRAZOLE SODIUM 40 MG/10ML
40 INJECTION, POWDER, FOR SOLUTION INTRAVENOUS
Refills: 0 | Status: DISCONTINUED | OUTPATIENT
Start: 2024-07-05 | End: 2024-07-09

## 2024-07-05 RX ORDER — FOLIC ACID
1 POWDER (GRAM) MISCELLANEOUS DAILY
Refills: 0 | Status: DISCONTINUED | OUTPATIENT
Start: 2024-07-05 | End: 2024-07-09

## 2024-07-05 RX ORDER — ACETAMINOPHEN 325 MG
650 TABLET ORAL ONCE
Refills: 0 | Status: COMPLETED | OUTPATIENT
Start: 2024-07-05 | End: 2024-07-05

## 2024-07-05 RX ORDER — ONDANSETRON HYDROCHLORIDE 2 MG/ML
4 INJECTION INTRAMUSCULAR; INTRAVENOUS EVERY 8 HOURS
Refills: 0 | Status: DISCONTINUED | OUTPATIENT
Start: 2024-07-05 | End: 2024-07-09

## 2024-07-05 RX ORDER — ATORVASTATIN CALCIUM 20 MG/1
20 TABLET, FILM COATED ORAL AT BEDTIME
Refills: 0 | Status: DISCONTINUED | OUTPATIENT
Start: 2024-07-05 | End: 2024-07-09

## 2024-07-05 RX ORDER — METOPROLOL TARTRATE 50 MG
1 TABLET ORAL
Refills: 0 | DISCHARGE

## 2024-07-05 RX ORDER — ALBUTEROL 90 MCG
2 AEROSOL REFILL (GRAM) INHALATION EVERY 6 HOURS
Refills: 0 | Status: DISCONTINUED | OUTPATIENT
Start: 2024-07-05 | End: 2024-07-09

## 2024-07-05 RX ORDER — ACETAMINOPHEN 325 MG
650 TABLET ORAL EVERY 6 HOURS
Refills: 0 | Status: DISCONTINUED | OUTPATIENT
Start: 2024-07-05 | End: 2024-07-09

## 2024-07-05 RX ORDER — ENOXAPARIN SODIUM 100 MG/ML
40 INJECTION SUBCUTANEOUS EVERY 24 HOURS
Refills: 0 | Status: DISCONTINUED | OUTPATIENT
Start: 2024-07-05 | End: 2024-07-09

## 2024-07-05 RX ORDER — ATORVASTATIN CALCIUM 20 MG/1
1 TABLET, FILM COATED ORAL
Refills: 0 | DISCHARGE

## 2024-07-05 RX ORDER — LIDOCAINE HCL 28 MG/G
1 GEL TOPICAL DAILY
Refills: 0 | Status: DISCONTINUED | OUTPATIENT
Start: 2024-07-05 | End: 2024-07-09

## 2024-07-05 RX ADMIN — Medication 650 MILLIGRAM(S): at 21:31

## 2024-07-05 NOTE — ED ADULT NURSE REASSESSMENT NOTE - NS ED NURSE REASSESS COMMENT FT1
Report received from TESFAYE Iniguez. Pt A&Ox3, resting in stretcher. Pt denies headache, dizziness, SOB, CP, blurry vision, weakness, N/V/D. respirations even and unlabored. safety maintained throughout, awaiting CT results Report received from TESFAYE Iniguez. Pt A&Ox3, resting in stretcher. Pt endorsing back pain at this time. Pt denies headache, dizziness, SOB, CP, blurry vision, weakness, N/V/D. respirations even and unlabored. safety maintained throughout, awaiting CT results

## 2024-07-05 NOTE — CONSULT NOTE ADULT - SUBJECTIVE AND OBJECTIVE BOX
NEUROSURGERY CONSULT    HPI: 69y Female pmhx RA p/w LBP after a mechanical fall down 4-5 steps hitting her back. Reports LBP radiating down right leg, pain exacerbated with movement.     RADIOLOGY:   IMPRESSION:    1.  THORACIC SPINE:   No thoracic vertebral fracture. Low-grade thoracic   degenerative disc disease    2.  LUMBAR SPINE:   L1 superior endplate fracture is associated with mild   kyphotic deformity.   The fracture is age indeterminate although does not   appear to be present on radiographs 8/14/2023. Consider MR evaluation.   Grade 1 degenerative anterior listhesis L4 on L5. Low-grade degenerative   disc disease greatest at the lowest 2 lumbar intervertebral disc levels    MEDS:        Vital Signs Last 24 Hrs  T(C): 36.6 (05 Jul 2024 14:41), Max: 36.6 (05 Jul 2024 14:41)  T(F): 97.9 (05 Jul 2024 14:41), Max: 97.9 (05 Jul 2024 14:41)  HR: 75 (05 Jul 2024 19:45) (64 - 75)  BP: 126/64 (05 Jul 2024 19:45) (107/63 - 126/64)  BP(mean): --  RR: 16 (05 Jul 2024 19:45) (16 - 16)  SpO2: 100% (05 Jul 2024 19:45) (97% - 100%)    Parameters below as of 05 Jul 2024 19:45  Patient On (Oxygen Delivery Method): room air        LABS:                        11.8   12.05 )-----------( 216      ( 05 Jul 2024 17:23 )             37.9     07-05    142  |  107  |  13  ----------------------------<  85  4.2   |  23  |  0.77    Ca    8.8      05 Jul 2024 17:23            PHYSICAL EXAM:  Awake, fc, OE, perrl, A&ox3  ruiz 5/5  silt  LB TTP

## 2024-07-05 NOTE — ED PROVIDER NOTE - PHYSICAL EXAMINATION
CONSTITUTIONAL: Well appearing, awake, alert, and in no apparent distress.  HEAD: normocephalic, atraumatic, no edema, open wounds, erythema, tenderness  ENT: oral mucosa moist  CARDIAC: regular rate and rhythm; no murmurs, rubs, or gallops  RESPIRATORY: normal breath sounds, clear to ascultation bilaterally, no rales, rhonchi, wheezing  GASTROINTESTINAL: abdomen nondistended, soft, no guarding, rebound tenderness; +tenderness over LUQ  MSK: Spine appears normal, range of motion is not limited; +midline L-spine tenderness and diffuse lumbar paraspinal tenderness, mild tenderness to R posterior chest wall, +tenderness over R ankle and foot  NEURO: Alert and oriented, no focal deficits, no motor or sensory deficits.  SKIN: Skin normal color for race, warm, dry and intact. No evidence of rash.  PSYCH: appropriate mood and affect

## 2024-07-05 NOTE — ED PROVIDER NOTE - CLINICAL SUMMARY MEDICAL DECISION MAKING FREE TEXT BOX
70 yo F, hx of RA, asthma, presents to ED with c/o low back pain after fall backwards PTA.  Patient was crouched down on a set of steps cleaning, lost her footing and fell backwards hitting her back and the back of her head.  No LOC or AC.  Took ibuprofen PTA.  Denies dizziness, visual changes, weakness.  VSS.  patient uncomfortable appearing, but in no acute distress, heart RRR, lungs clear, abd soft with mild LUQ tenderness, +R posterior chest wall tenderness, R ankle/foot tendrness.  Will assess for head injury, contusion vs fractures.

## 2024-07-05 NOTE — ED PROVIDER NOTE - PROGRESS NOTE DETAILS
Jay Walls MD PGY3: L1 superior endplate fxr. nsgy spine consulted. updated pt on results and incidental lung finding. Jay Walls MD PGY3: nsgy recs mri l spine and brace which will be delivered in am. cdu beds full. to admit.

## 2024-07-05 NOTE — CONSULT NOTE ADULT - ASSESSMENT
69y F s/p trip and fall down steps with L1 superior endplate compression fx, with some kyphosis.    Recc:  - LSO brace will call orthotist for brace  - MRI L/s non con

## 2024-07-05 NOTE — ED PROVIDER NOTE - ATTENDING CONTRIBUTION TO CARE
Brief HPI:  69-year-old female past medical history of rheumatoid arthritis, asthma presents for evaluation of injuries after fall.  Patient states that she fell down 4-5 steps while cleaning onto her back.  Reports head trauma without loss of consciousness.  Currently reporting pain in the lumbar and thoracic back area.  Also with bilateral foot pain.  Denies alcohol or illicit drug use.  Not on anticoagulation.  Denies visual changes, speech changes, numbness, tingling, weakness, abdominal pain, chest pain.    Vitals:   Reviewed    Exam:    GEN:  Non-toxic appearing, non-distressed, speaking full sentences, non-diaphoretic, AAOx3  HEENT:  NCAT, neck supple, EOMI, PERRLA, sclera anicteric, no conjunctival pallor or injection, no stridor, normal voice, no tonsillar exudate, uvula midline  CV:  regular rhythm and rate, s1/s2 audible, no murmurs, rubs or gallops, peripheral pulses 2+ and symmetric  PULM:  non-labored respirations, lungs clear to auscultation bilaterally, no wheezes, crackles or rales  ABD:  non distended, non-tender, no rebound, no guarding, negative Gage's sign, bowel sounds normal, no cvat  MSK:  no gross deformity, b/l ankle tenderness, range of motion grossly normal appearing, no extremity edema, extremities warm and well perfused   NEURO:  AAOx3, CN II-XII intact, motor 5/5 in upper and lower extremities bilaterally, sensation grossly intact in extremities and trunk, finger to nose testing wnl, no nystagmus, negative Romberg, no pronator drift, no gait deficit  SPINE:  no midline c/t/l spine tenderness   SKIN:  warm, dry, no rash or vesicles     A/P:  69-year-old female past medical history of rheumatoid arthritis, asthma presents for evaluation of injuries after fall.  GCS 15, no focal neuro deficits.  Will obtain labs, x-rays, ct.  Disposition pending.

## 2024-07-05 NOTE — ED ADULT NURSE NOTE - OBJECTIVE STATEMENT
A&Ox4. ambulatory. c/o head strike after falling down 3/4 carpeted steps today. PT states she lost her footing and fell backward. NAD. pt denies SOB, chest pain, dizziness, weakness, urinary symptoms, HA, n/v/d, fevers, chills, pain, LOC, vision changes. no redness or swelling observed to posterior head. respirations are even and un labored. ABD is soft and non tender. skin intact. safety precautions maintained. call bell at bedside.

## 2024-07-05 NOTE — ED PROVIDER NOTE - IV ALTEPLASE ADMIN OUTSIDE HIDDEN
Migraines reported to be doing better  Reports she has daily HA   reports about 4-5 migraines weekly  Prn meds work ok for her, mentioned she would like to discuss some of the PRN med, said the ubrelvy and sumatriptan needs a PA   show

## 2024-07-05 NOTE — ED ADULT TRIAGE NOTE - CHIEF COMPLAINT QUOTE
s/p slip and fall X 1 hour ago. reports fell down 4-5 steps. + hititng head. denies LOC. takes ASA. hx of rheumatoid arthritis, asthma,

## 2024-07-05 NOTE — ED ADULT NURSE NOTE - NSFALLRISKINTERV_ED_ALL_ED

## 2024-07-05 NOTE — ED PROVIDER NOTE - OBJECTIVE STATEMENT
70 yo F, hx of RA, asthma, presents to ED with c/o low back pain after fall backwards PTA.  Patient was crouched down on a set of steps cleaning, lost her footing and fell backwards hitting her back and the back of her head.  No LOC or AC.  Took ibuprofen PTA.  Denies dizziness, visual changes, weakness.

## 2024-07-06 DIAGNOSIS — S32.000A WEDGE COMPRESSION FRACTURE OF UNSPECIFIED LUMBAR VERTEBRA, INITIAL ENCOUNTER FOR CLOSED FRACTURE: ICD-10-CM

## 2024-07-06 DIAGNOSIS — I10 ESSENTIAL (PRIMARY) HYPERTENSION: ICD-10-CM

## 2024-07-06 DIAGNOSIS — E78.5 HYPERLIPIDEMIA, UNSPECIFIED: ICD-10-CM

## 2024-07-06 PROCEDURE — 99223 1ST HOSP IP/OBS HIGH 75: CPT

## 2024-07-06 RX ORDER — OXYCODONE HYDROCHLORIDE 100 MG/5ML
5 SOLUTION ORAL EVERY 6 HOURS
Refills: 0 | Status: DISCONTINUED | OUTPATIENT
Start: 2024-07-06 | End: 2024-07-09

## 2024-07-06 RX ADMIN — LIDOCAINE HCL 1 PATCH: 28 GEL TOPICAL at 12:13

## 2024-07-06 RX ADMIN — ATORVASTATIN CALCIUM 20 MILLIGRAM(S): 20 TABLET, FILM COATED ORAL at 00:56

## 2024-07-06 RX ADMIN — ATORVASTATIN CALCIUM 20 MILLIGRAM(S): 20 TABLET, FILM COATED ORAL at 23:17

## 2024-07-06 RX ADMIN — Medication 40 MILLIGRAM(S): at 00:58

## 2024-07-06 RX ADMIN — Medication 25 MILLIGRAM(S): at 07:30

## 2024-07-06 RX ADMIN — PANTOPRAZOLE SODIUM 40 MILLIGRAM(S): 40 INJECTION, POWDER, FOR SOLUTION INTRAVENOUS at 07:31

## 2024-07-06 RX ADMIN — OXYCODONE HYDROCHLORIDE 5 MILLIGRAM(S): 100 SOLUTION ORAL at 16:58

## 2024-07-06 RX ADMIN — LIDOCAINE HCL 1 PATCH: 28 GEL TOPICAL at 00:56

## 2024-07-06 RX ADMIN — LIDOCAINE HCL 1 PATCH: 28 GEL TOPICAL at 19:35

## 2024-07-06 RX ADMIN — OXYCODONE HYDROCHLORIDE 2.5 MILLIGRAM(S): 100 SOLUTION ORAL at 08:01

## 2024-07-06 RX ADMIN — Medication 1 MILLIGRAM(S): at 12:13

## 2024-07-06 RX ADMIN — LIDOCAINE HCL 1 PATCH: 28 GEL TOPICAL at 07:07

## 2024-07-06 RX ADMIN — OXYCODONE HYDROCHLORIDE 5 MILLIGRAM(S): 100 SOLUTION ORAL at 17:13

## 2024-07-06 RX ADMIN — OXYCODONE HYDROCHLORIDE 2.5 MILLIGRAM(S): 100 SOLUTION ORAL at 07:31

## 2024-07-06 RX ADMIN — MONTELUKAST SODIUM 10 MILLIGRAM(S): 10 TABLET, FILM COATED ORAL at 07:31

## 2024-07-06 RX ADMIN — LIDOCAINE HCL 1 PATCH: 28 GEL TOPICAL at 12:51

## 2024-07-06 NOTE — H&P ADULT - NSHPPHYSICALEXAM_GEN_ALL_CORE
PHYSICAL EXAM:  GENERAL: NAD, comfortable at bedside   HEAD:  Atraumatic, Normocephalic  EYES: EOMI, PERRL, conjunctiva and sclera clear  NECK: Supple, No JVD  CHEST/LUNG: Clear to auscultation bilaterally; No wheezes, rales or rhonchi  HEART: Regular rate and rhythm; No murmurs, rubs, or gallops, (+)S1, S2  ABDOMEN: Soft, Nontender, Nondistended; Normal Bowel sounds, no bruising noted  EXTREMITIES:  2+ Peripheral Pulses, No clubbing, cyanosis, or edema  PSYCH: normal mood and affect  NEUROLOGY: AAOx3, 5/5 strength in all extremities  SKIN: No rashes or lesions

## 2024-07-06 NOTE — PROGRESS NOTE ADULT - ASSESSMENT
69 yr old female with a pmh of RA, asthma, HTN, HLD who presents with lower back pain following a fall. Admitted for age indeterminate compression fracture at superior endplate of L1.

## 2024-07-06 NOTE — H&P ADULT - HISTORY OF PRESENT ILLNESS
69 yr old female with a pmh of RA, asthma, HTN, HLD who presents with lower back pain following a fall. Pt was cleaning her steps and she was walking backwards and missed a step causing her to fall ~4 steps and hitting the wall with her body/head. No LOC. Had mild dizziness following the episode but that has resolved. Endorsing lower back pain and abdominal pain from when her legs bent into her abdomen when she fell.   Denies  headache, dizziness, chest pain, palpitations, SOB, diarrhea/constipation, urinary symptoms.   Vitals: T 97.9, HR 75, /64, RR 16 satting 100% RA

## 2024-07-06 NOTE — H&P ADULT - NSHPREVIEWOFSYSTEMS_GEN_ALL_CORE
REVIEW OF SYSTEMS:    CONSTITUTIONAL: No weakness, fevers or chills  EYES/ENT: No visual changes;  No dysphagia; No sore throat; No rhinorrhea; No sinus pain/pressure  NECK: No pain or stiffness  RESPIRATORY: No cough, wheezing, hemoptysis; No shortness of breath  CARDIOVASCULAR: No chest pain or palpitations; No lower extremity edema  GASTROINTESTINAL: + abdominal  pain. No nausea, vomiting, or hematemesis; No diarrhea or constipation. No melena or hematochezia.  GENITOURINARY: No dysuria, frequency or hematuria  NEUROLOGICAL: No numbness or weakness  MSK: ambulates with a cane/walker + back pain   SKIN: No itching, burning, rashes, or lesions   All other review of systems is negative unless indicated above.

## 2024-07-06 NOTE — H&P ADULT - PROBLEM SELECTOR PLAN 1
New  Imaging: Age indeterminate compression fracture at superior endplate of L1.   Remaining imaging with no acute traumatic findings  Neurosurgery consulted: LSO brace will call orthotist for brace. MRI L/s non con  Lidocaine patch, Tylenol prn for mild pain and oxycodone 2.5mg Q6 PRN for moderate pain   PT consult

## 2024-07-06 NOTE — H&P ADULT - NSHPSOCIALHISTORY_GEN_ALL_CORE
Does not use tobacco products (former smoker >20yrs ago), consume alcohol or partake in illicit drug use   Lives with

## 2024-07-06 NOTE — H&P ADULT - ASSESSMENT
69 yr old female presenting with an nge indeterminate compression fracture at superior endplate of L1.

## 2024-07-06 NOTE — H&P ADULT - NSICDXPASTMEDICALHX_GEN_ALL_CORE_FT
PAST MEDICAL HISTORY:  Asthma     Atrophic vaginitis     Benign essential HTN     GERD (gastroesophageal reflux disease)     History of cardiac arrhythmia     HLD (hyperlipidemia)     Migraines     Osteoporosis     Rheumatoid arthritis     Seasonal allergies

## 2024-07-06 NOTE — PROGRESS NOTE ADULT - SUBJECTIVE AND OBJECTIVE BOX
PROGRESS NOTE:     Patient is a 69y old  Female who presents with a chief complaint of  at bedside (06 Jul 2024 00:00)      SUBJECTIVE / OVERNIGHT EVENTS:    OVERNIGHT: No acute overnight events.      Patient was examined at bedside and feels well this morning. Denies fever, chills, chest pain, SOB, nausea, vomiting. ROS otherwise negative and pt is amenable to current treatment plan.      REVIEW OF SYSTEMS:    CONSTITUTIONAL:  No weakness, fevers, or chills  EYES/ENT:  No visual changes, vertigo, or throat pain   NECK:  No pain or stiffness  RESPIRATORY:  No SOB, cough, wheezing, or hemoptysis  CARDIOVASCULAR:  No chest pain or palpitations  GASTROINTESTINAL:  No abdominal pain, nausea, vomiting, or hematemesis; No diarrhea or constipation; No melena or hematochezia.  GENITOURINARY:  No dysuria, change in frequency, or hematuria  NEUROLOGICAL:  No numbness or weakness  SKIN:  No itching or rashes      MEDICATIONS  (STANDING):  atorvastatin 20 milliGRAM(s) Oral at bedtime  enoxaparin Injectable 40 milliGRAM(s) SubCutaneous every 24 hours  famotidine    Tablet 40 milliGRAM(s) Oral at bedtime  folic acid 1 milliGRAM(s) Oral daily  lidocaine   4% Patch 1 Patch Transdermal daily  metoprolol succinate ER 25 milliGRAM(s) Oral with breakfast  montelukast 10 milliGRAM(s) Oral with breakfast  pantoprazole    Tablet 40 milliGRAM(s) Oral before breakfast    MEDICATIONS  (PRN):  acetaminophen     Tablet .. 650 milliGRAM(s) Oral every 6 hours PRN Temp greater or equal to 38C (100.4F), Mild Pain (1 - 3)  albuterol    90 MICROgram(s) HFA Inhaler 2 Puff(s) Inhalation every 6 hours PRN Shortness of Breath and/or Wheezing  aluminum hydroxide/magnesium hydroxide/simethicone Suspension 30 milliLiter(s) Oral every 4 hours PRN Dyspepsia  melatonin 3 milliGRAM(s) Oral at bedtime PRN Insomnia  ondansetron Injectable 4 milliGRAM(s) IV Push every 8 hours PRN Nausea and/or Vomiting  oxyCODONE    IR 2.5 milliGRAM(s) Oral every 6 hours PRN Moderate Pain (4 - 6)      CAPILLARY BLOOD GLUCOSE        I&O's Summary      PHYSICAL EXAM:  Vital Signs Last 24 Hrs  T(C): 37.3 (06 Jul 2024 07:30), Max: 37.3 (06 Jul 2024 07:30)  T(F): 99.1 (06 Jul 2024 07:30), Max: 99.1 (06 Jul 2024 07:30)  HR: 65 (06 Jul 2024 07:30) (60 - 75)  BP: 116/57 (06 Jul 2024 07:30) (101/60 - 126/64)  BP(mean): --  RR: 18 (06 Jul 2024 07:30) (16 - 18)  SpO2: 98% (06 Jul 2024 07:30) (97% - 100%)    Parameters below as of 06 Jul 2024 07:30  Patient On (Oxygen Delivery Method): room air        CONSTITUTIONAL: NAD; well-developed  HEENT: PERRL, clear conjunctiva  RESPIRATORY: Normal respiratory effort; lungs are clear to auscultation bilaterally; No crackles/rhonchi/wheezing  CARDIOVASCULAR: Regular rate and rhythm, normal S1 and S2, no murmur/rub/gallop; No lower extremity edema; Peripheral pulses are 2+ bilaterally  ABDOMEN: Nontender to palpation, normoactive bowel sounds, no rebound/guarding; No hepatosplenomegaly  MUSCULOSKELETAL: No clubbing or cyanosis of digits; no joint swelling or tenderness to palpation  EXTREMITY: Lower extremities non-tender to palpation; non-erythematous B/L  NEURO: A&Ox3; no focal deficits   PSYCH: Normal mood; affect appropriate    LABS:                        11.8   12.05 )-----------( 216      ( 05 Jul 2024 17:23 )             37.9     07-05    142  |  107  |  13  ----------------------------<  85  4.2   |  23  |  0.77    Ca    8.8      05 Jul 2024 17:23            Urinalysis Basic - ( 05 Jul 2024 17:23 )    Color: x / Appearance: x / SG: x / pH: x  Gluc: 85 mg/dL / Ketone: x  / Bili: x / Urobili: x   Blood: x / Protein: x / Nitrite: x   Leuk Esterase: x / RBC: x / WBC x   Sq Epi: x / Non Sq Epi: x / Bacteria: x          RADIOLOGY & ADDITIONAL TESTS:    N/A   PROGRESS NOTE:     Patient is a 69y old  Female who presents with a chief complaint of  at bedside (06 Jul 2024 00:00)      SUBJECTIVE / OVERNIGHT EVENTS:    OVERNIGHT: No acute overnight events.      Patient was examined at bedside and feels pain in the right leg. Denies fever, chills, chest pain, SOB, nausea, vomiting. ROS otherwise negative and pt is amenable to current treatment plan.      REVIEW OF SYSTEMS:    CONSTITUTIONAL:  No weakness, fevers, or chills  EYES/ENT:  No visual changes, vertigo, or throat pain   NECK:  No pain or stiffness  RESPIRATORY:  No SOB, cough, wheezing, or hemoptysis  CARDIOVASCULAR:  No chest pain or palpitations  GASTROINTESTINAL:  No abdominal pain, nausea, vomiting, or hematemesis; No diarrhea or constipation; No melena or hematochezia.  GENITOURINARY:  No dysuria, change in frequency, or hematuria  NEUROLOGICAL:  No numbness or weakness  SKIN:  No itching or rashes      MEDICATIONS  (STANDING):  atorvastatin 20 milliGRAM(s) Oral at bedtime  enoxaparin Injectable 40 milliGRAM(s) SubCutaneous every 24 hours  famotidine    Tablet 40 milliGRAM(s) Oral at bedtime  folic acid 1 milliGRAM(s) Oral daily  lidocaine   4% Patch 1 Patch Transdermal daily  metoprolol succinate ER 25 milliGRAM(s) Oral with breakfast  montelukast 10 milliGRAM(s) Oral with breakfast  pantoprazole    Tablet 40 milliGRAM(s) Oral before breakfast    MEDICATIONS  (PRN):  acetaminophen     Tablet .. 650 milliGRAM(s) Oral every 6 hours PRN Temp greater or equal to 38C (100.4F), Mild Pain (1 - 3)  albuterol    90 MICROgram(s) HFA Inhaler 2 Puff(s) Inhalation every 6 hours PRN Shortness of Breath and/or Wheezing  aluminum hydroxide/magnesium hydroxide/simethicone Suspension 30 milliLiter(s) Oral every 4 hours PRN Dyspepsia  melatonin 3 milliGRAM(s) Oral at bedtime PRN Insomnia  ondansetron Injectable 4 milliGRAM(s) IV Push every 8 hours PRN Nausea and/or Vomiting  oxyCODONE    IR 2.5 milliGRAM(s) Oral every 6 hours PRN Moderate Pain (4 - 6)      CAPILLARY BLOOD GLUCOSE        I&O's Summary      PHYSICAL EXAM:  Vital Signs Last 24 Hrs  T(C): 37.3 (06 Jul 2024 07:30), Max: 37.3 (06 Jul 2024 07:30)  T(F): 99.1 (06 Jul 2024 07:30), Max: 99.1 (06 Jul 2024 07:30)  HR: 65 (06 Jul 2024 07:30) (60 - 75)  BP: 116/57 (06 Jul 2024 07:30) (101/60 - 126/64)  BP(mean): --  RR: 18 (06 Jul 2024 07:30) (16 - 18)  SpO2: 98% (06 Jul 2024 07:30) (97% - 100%)    Parameters below as of 06 Jul 2024 07:30  Patient On (Oxygen Delivery Method): room air        CONSTITUTIONAL: NAD; well-developed  HEENT: PERRL, clear conjunctiva  RESPIRATORY: Normal respiratory effort; lungs are clear to auscultation bilaterally; No crackles/rhonchi/wheezing  CARDIOVASCULAR: Regular rate and rhythm, normal S1 and S2, no murmur/rub/gallop; No lower extremity edema; Peripheral pulses are 2+ bilaterally  ABDOMEN: Nontender to palpation, normoactive bowel sounds, no rebound/guarding; No hepatosplenomegaly  MUSCULOSKELETAL: No clubbing or cyanosis of digits; no joint swelling or tenderness to palpation  EXTREMITY: Lower extremities non-tender to palpation; non-erythematous B/L  NEURO: A&Ox3; no focal deficits   PSYCH: Normal mood; affect appropriate    LABS:                        11.8   12.05 )-----------( 216      ( 05 Jul 2024 17:23 )             37.9     07-05    142  |  107  |  13  ----------------------------<  85  4.2   |  23  |  0.77    Ca    8.8      05 Jul 2024 17:23            Urinalysis Basic - ( 05 Jul 2024 17:23 )    Color: x / Appearance: x / SG: x / pH: x  Gluc: 85 mg/dL / Ketone: x  / Bili: x / Urobili: x   Blood: x / Protein: x / Nitrite: x   Leuk Esterase: x / RBC: x / WBC x   Sq Epi: x / Non Sq Epi: x / Bacteria: x          RADIOLOGY & ADDITIONAL TESTS:    N/A

## 2024-07-06 NOTE — PATIENT PROFILE ADULT - FALL HARM RISK - HARM RISK INTERVENTIONS

## 2024-07-06 NOTE — H&P ADULT - NSHPLABSRESULTS_GEN_ALL_CORE
11.8   12.05 )-----------( 216      ( 05 Jul 2024 17:23 )             37.9     142  |  107  |  13  ----------------------------<  85     07-05  4.2   |  23  |  0.77    Ca    8.8      05 Jul 2024 17:23      Images interpreted by radiology:     CT C/A/P: No CT evidence for an acute intrathoracic, intra-abdominal or intrapelvic injury.  Age indeterminate compression fracture at superior endplate of L1.   Nonspecific 7 mm right lower lobe lung nodule; follow-up chest CT is  recommended in 6 months to ensure stability.  Trace pericardial effusion.  Cholelithiasis without evidence of thickened gallbladder wall or pericholecystic fluid.    CT head: No evidence of intracranial injury.    CT  THORACIC SPINE:   No thoracic vertebral fracture. Low-grade thoracic  degenerative disc disease    CT  LUMBAR SPINE:   L1 superior endplate fracture is associated with mild   kyphotic deformity.   The fracture is age indeterminate although does not   appear to be present on radiographs 8/14/2023. Consider MR evaluation.   Grade 1 degenerative anterior listhesis L4 on L5. Low-grade degenerative   disc disease greatest at the lowest 2 lumbar intervertebral disc levels    Xray right foot/ankle: No acute fracture.    Xray pelvis: No acute fracture.

## 2024-07-06 NOTE — PROGRESS NOTE ADULT - PROBLEM SELECTOR PLAN 1
CT lumbar spine: Age indeterminate compression fracture at superior endplate of L1.   Remaining CT/xrayimaging with no acute traumatic findings  Neurosurgery consulted: LSO brace will call orthotist for brace. MRI L/s non con  Lidocaine patch, Tylenol prn for mild pain and oxycodone 2.5mg Q6 PRN for moderate pain   PT consult  Plan:   - appreciate PT rec  - awaiting MRI L/S  - consider increasing oxycodone to 5mg

## 2024-07-07 PROCEDURE — 99232 SBSQ HOSP IP/OBS MODERATE 35: CPT

## 2024-07-07 RX ORDER — METHOTREXATE 25 MG/.4ML
6 INJECTION, SOLUTION SUBCUTANEOUS
Refills: 0 | DISCHARGE

## 2024-07-07 RX ORDER — METHOTREXATE 25 MG/.4ML
15 INJECTION, SOLUTION SUBCUTANEOUS
Refills: 0 | Status: DISCONTINUED | OUTPATIENT
Start: 2024-07-08 | End: 2024-07-09

## 2024-07-07 RX ORDER — CALCIUM CARBONATE/VITAMIN D2 250 MG-125
1 TABLET ORAL DAILY
Refills: 0 | Status: DISCONTINUED | OUTPATIENT
Start: 2024-07-07 | End: 2024-07-09

## 2024-07-07 RX ORDER — OMEPRAZOLE 10 MG/1
1 CAPSULE, DELAYED RELEASE ORAL
Refills: 0 | DISCHARGE

## 2024-07-07 RX ORDER — METHOTREXATE 25 MG/.4ML
15 INJECTION, SOLUTION SUBCUTANEOUS
Refills: 0 | Status: DISCONTINUED | OUTPATIENT
Start: 2024-07-07 | End: 2024-07-07

## 2024-07-07 RX ORDER — MONTELUKAST SODIUM 10 MG/1
1 TABLET, FILM COATED ORAL
Refills: 0 | DISCHARGE

## 2024-07-07 RX ORDER — CALCIUM CARBONATE 500(1250)
1 TABLET,CHEWABLE ORAL
Refills: 0 | DISCHARGE

## 2024-07-07 RX ORDER — FLUTICASONE PROPIONATE 50 UG/1
1 SPRAY, METERED NASAL
Refills: 0 | DISCHARGE

## 2024-07-07 RX ADMIN — MONTELUKAST SODIUM 10 MILLIGRAM(S): 10 TABLET, FILM COATED ORAL at 06:52

## 2024-07-07 RX ADMIN — Medication 3 MILLIGRAM(S): at 21:47

## 2024-07-07 RX ADMIN — ATORVASTATIN CALCIUM 20 MILLIGRAM(S): 20 TABLET, FILM COATED ORAL at 21:48

## 2024-07-07 RX ADMIN — Medication 1 TABLET(S): at 19:04

## 2024-07-07 RX ADMIN — ENOXAPARIN SODIUM 40 MILLIGRAM(S): 100 INJECTION SUBCUTANEOUS at 21:47

## 2024-07-07 RX ADMIN — LIDOCAINE HCL 1 PATCH: 28 GEL TOPICAL at 01:49

## 2024-07-07 RX ADMIN — Medication 400 UNIT(S): at 19:04

## 2024-07-07 RX ADMIN — Medication 40 MILLIGRAM(S): at 21:56

## 2024-07-07 RX ADMIN — Medication 1 MILLIGRAM(S): at 19:03

## 2024-07-07 RX ADMIN — PANTOPRAZOLE SODIUM 40 MILLIGRAM(S): 40 INJECTION, POWDER, FOR SOLUTION INTRAVENOUS at 06:53

## 2024-07-07 RX ADMIN — LIDOCAINE HCL 1 PATCH: 28 GEL TOPICAL at 19:06

## 2024-07-07 RX ADMIN — Medication 25 MILLIGRAM(S): at 06:52

## 2024-07-07 NOTE — DISCHARGE NOTE PROVIDER - NSDCCPTREATMENT_GEN_ALL_CORE_FT
PRINCIPAL PROCEDURE  Procedure: MRI lumbar spine wo contrast  Findings and Treatment: IMPRESSION:  Redemonstration of moderate compression deformity of superior endplate of   L1. Edema within the subjacent vertebral body suggests acuity of   fracture. Minimally retropulsed bone is asymmetric to the right. Mild   kyphosis noted at this level.  Minimal superior endplate compression deformity of L3 with associated   subjacent edema suggesting acuity of fracture. No retropulsed bone.  Edema and approximating the endplate of T12, suggesting the presence of   occult fracture. No loss of vertebral body height or retropulsed bone.  Degenerative changes as detailed in the body the report.        SECONDARY PROCEDURE  Procedure: CT lumbar spine  Findings and Treatment:   IMPRESSION:  1.  THORACIC SPINE:   No thoracic vertebral fracture. Low-grade thoracic   degenerative disc disease  2.  LUMBAR SPINE:   L1 superior endplate fracture is associated with mild   kyphotic deformity.   The fracture is age indeterminate although does not   appear to be present on radiographs 8/14/2023. Consider MR evaluation.   Grade 1 degenerative anterior listhesis L4 on L5. Low-grade degenerative   disc disease greatest at the lowest 2 lumbar intervertebral disc levels

## 2024-07-07 NOTE — DISCHARGE NOTE PROVIDER - NSDCMRMEDTOKEN_GEN_ALL_CORE_FT
albuterol 90 mcg/inh inhalation aerosol with adapter: 1 dose(s) inhaled , As Needed  atorvastatin 20 mg oral tablet: 1 tab(s) orally once a day (at bedtime)  famotidine 40 mg oral tablet: 1 tab(s) orally once a day (at bedtime)  fluticasone nasal: 1 application once a day  folic acid 1 mg oral tablet: 1 tab(s) orally once a day  methotrexate 2.5 mg oral tablet: 6 tab(s) orally once a week on Sunday  metoprolol succinate 25 mg oral tablet, extended release: 1 tab(s) orally once a day  omeprazole 40 mg oral delayed release capsule: 1 cap(s) orally once a day  Oysco 500 (1250 mg calcium carbonate) oral tablet: 1 tab(s) orally once a day  Singulair 10 mg oral tablet: 1 tab(s) orally once a day   albuterol 90 mcg/inh inhalation aerosol with adapter: 1 dose(s) inhaled , As Needed  atorvastatin 20 mg oral tablet: 1 tab(s) orally once a day (at bedtime)  famotidine 40 mg oral tablet: 1 tab(s) orally once a day (at bedtime)  fluticasone nasal: 1 application once a day  methotrexate 2.5 mg oral tablet: 6 tab(s) orally once a week on Sunday  metoprolol succinate 25 mg oral tablet, extended release: 1 tab(s) orally once a day  omeprazole 40 mg oral delayed release capsule: 1 cap(s) orally once a day  oxyCODONE 5 mg oral tablet: 1 tab(s) orally every 6 hours as needed for Moderate Pain (4 - 6)  Oysco 500 (1250 mg calcium carbonate) oral tablet: 1 tab(s) orally once a day  Singulair 10 mg oral tablet: 1 tab(s) orally once a day   albuterol 90 mcg/inh inhalation aerosol with adapter: 1 dose(s) inhaled prn as needed for  bronchospasm  atorvastatin 20 mg oral tablet: 1 tab(s) orally once a day (at bedtime)  famotidine 40 mg oral tablet: 1 tab(s) orally once a day (at bedtime)  fluticasone nasal: 1 application once a day  methotrexate 2.5 mg oral tablet: 6 tab(s) orally once a week on Sunday  metoprolol succinate 25 mg oral tablet, extended release: 1 tab(s) orally once a day  omeprazole 40 mg oral delayed release capsule: 1 cap(s) orally once a day  Oysco 500 (1250 mg calcium carbonate) oral tablet: 1 tab(s) orally once a day  Singulair 10 mg oral tablet: 1 tab(s) orally once a day   albuterol 90 mcg/inh inhalation aerosol with adapter: 1 dose(s) inhaled prn as needed for  bronchospasm  atorvastatin 20 mg oral tablet: 1 tab(s) orally once a day (at bedtime)  famotidine 40 mg oral tablet: 1 tab(s) orally once a day (at bedtime)  fluticasone nasal: 1 application once a day  Home pt: home PT  methotrexate 2.5 mg oral tablet: 6 tab(s) orally once a week on Sunday  metoprolol succinate 25 mg oral tablet, extended release: 1 tab(s) orally once a day  omeprazole 40 mg oral delayed release capsule: 1 cap(s) orally once a day  Oysco 500 (1250 mg calcium carbonate) oral tablet: 1 tab(s) orally once a day  Singulair 10 mg oral tablet: 1 tab(s) orally once a day

## 2024-07-07 NOTE — PHYSICAL THERAPY INITIAL EVALUATION ADULT - PERTINENT HX OF CURRENT PROBLEM, REHAB EVAL
Patient is 69 year old female with a PMH of RA, asthma, HTN, HLD who presents with lower back pain following a fall. Admitted for age indeterminate compression fracture at superior endplate of L1.

## 2024-07-07 NOTE — DISCHARGE NOTE PROVIDER - NSDCFUADDAPPT_GEN_ALL_CORE_FT
APPTS ARE READY TO BE MADE: [x] YES    Best Family or Patient Contact (if needed):    Additional Information about above appointments (if needed):    1: Neurosurgery with Dr. Dumont for Outpatient followup 1-2 weeks  2: PCP within 1-2 weeks  3:     Other comments or requests:    APPTS ARE READY TO BE MADE: [x] YES    Best Family or Patient Contact (if needed):    Additional Information about above appointments (if needed):    1: Neurosurgery with Dr. Dumont for Outpatient followup 1-2 weeks  2: PCP within 1-2 weeks  3:     Other comments or requests:   Appointment was scheduled in [facility] on 09/05 at 10am with  at [address] [place] [address]. office has been tasked for a sooner appointment    Patient informed us they already have secured a follow up appointment which is not visible on [facility] on 07/25 with dr. hobbs

## 2024-07-07 NOTE — DISCHARGE NOTE PROVIDER - CARE PROVIDER_API CALL
Stevie Dumont  Neurosurgery  [address] [facility]  [place] [address]  Phone: [phone]  Fax: [phone]  Follow Up Time:

## 2024-07-07 NOTE — PROGRESS NOTE ADULT - PROBLEM SELECTOR PLAN 2
Chronic stable  Plan:   Continue metoprolol XL 25mg daily   Monitor Chronic stable    Plan:   Continue metoprolol XL 25mg daily   Monitor

## 2024-07-07 NOTE — DISCHARGE NOTE PROVIDER - HOSPITAL COURSE
69 yr old female with a PMH of RA, asthma, HTN, HLD who presents with lower back pain following a fall. Admitted for age indeterminate compression fracture at superior endplate of L1.       Hospital course:   CT lumbar showed Age indeterminate compression fracture at superior endplate of L1. Remaining CT/xrayimaging with no acute traumatic findings.   Neurosurgery consulted who recommended a brace (LSO brace will call orthotist for brace) and MRI L/s non con. Patient's pain has been managed with Lidocaine patch, Tylenol prn for mild pain and oxycodone 5mg Q6 PRN for moderate pain.      Medication changes and reasons:   Oxycodone 5mg q6 PRN for moderate back pain    Discharge diagnoses:   Lumbar compression fracture HPI:  69 yr old female with a pmh of RA, asthma, HTN, HLD who presents with lower back pain following a fall. Pt was cleaning her steps and she was walking backwards and missed a step causing her to fall ~4 steps and hitting the wall with her body/head. No LOC. Had mild dizziness following the episode but that has resolved. Endorsing lower back pain and abdominal pain from when her legs bent into her abdomen when she fell.   Denies  headache, dizziness, chest pain, palpitations, SOB, diarrhea/constipation, urinary symptoms.   Vitals: T 97.9, HR 75, /64, RR 16 satting 100% RA   (06 Jul 2024 00:00)    Hospital Course:  CT lumbar showed Age indeterminate compression fracture at superior endplate of L1. Remaining CT/xrayimaging with no acute traumatic findings.   Neurosurgery consulted who recommended a brace (LSO brace will call orthotist for brace) and MRI L/s non con. Patient's pain has been managed with Lidocaine patch, Tylenol prn for mild pain and oxycodone 5mg Q6 PRN for moderate pain.    Important Medication Changes and Reason:  Oxycodone 5mg q6 PRN for moderate back pain    Active or Pending Issues Requiring Follow-up:    Advanced Directives:   [ ] Full code  [ ] DNR  [ ] Hospice    Discharge Diagnoses:  Lumbar compression fracture          HPI:  69 yr old female with a pmh of RA, asthma, HTN, HLD who presents with lower back pain following a fall. Pt was cleaning her steps and she was walking backwards and missed a step causing her to fall ~4 steps and hitting the wall with her body/head. No LOC. Had mild dizziness following the episode but that has resolved. Endorsing lower back pain and abdominal pain from when her legs bent into her abdomen when she fell. Denies  headache, dizziness, chest pain, palpitations, SOB, diarrhea/constipation, urinary symptoms.   Vitals: T 97.9, HR 75, /64, RR 16 satting 100% RA     Hospital Course:  CT lumbar showed Age indeterminate compression fracture at superior endplate of L1. Remaining CT/xrayimaging with no acute traumatic findings.   Neurosurgery consulted who recommended a brace (LSO brace will call orthotist for brace) and MRI L/s non con. Patient's pain has been managed with Lidocaine patch, Tylenol prn for mild pain and oxycodone 5mg Q6 PRN for moderate pain. MRI showed acute compression fracture of L1, L3 and T12 w/o ligametous injury. Patient had no signs of cord compression and pain was well controlled throughout her hospital stay. Neurosurgery deemed surgery is not indicated. Patient is discharge in stable condition with plans for home PT and to follow up with Dr. Tim NEVAREZ.    Important Medication Changes and Reason:  Oxycodone 5mg q6 PRN for moderate back pain    Active or Pending Issues Requiring Follow-up:    Advanced Directives:   [x] Full code  [ ] DNR  [ ] Hospice    Discharge Diagnoses:  Spinal Compression Fracture and L1, L3, T12         HPI:  69 yr old female with a pmh of RA, asthma, HTN, HLD who presents with lower back pain following a fall. Pt was cleaning her steps and she was walking backwards and missed a step causing her to fall ~4 steps and hitting the wall with her body/head. No LOC. Had mild dizziness following the episode but that has resolved. Endorsing lower back pain and abdominal pain from when her legs bent into her abdomen when she fell. Denies  headache, dizziness, chest pain, palpitations, SOB, diarrhea/constipation, urinary symptoms.   Vitals: T 97.9, HR 75, /64, RR 16 satting 100% RA     Hospital Course:  CT lumbar showed Age indeterminate compression fracture at superior endplate of L1. Remaining CT/xrayimaging with no acute traumatic findings.   Neurosurgery consulted who recommended a brace (LSO brace will call orthotist for brace) and MRI L/s non con. Patient's pain has been managed with Lidocaine patch, Tylenol prn for mild pain and oxycodone 5mg Q6 PRN for moderate pain. MRI showed acute compression fracture of L1, L3 and T12 w/o ligametous injury. Patient had no signs of cord compression and pain was well controlled throughout her hospital stay. Neurosurgery deemed surgery is not indicated. Patient is discharge in stable condition with plans for home PT and to follow up with Dr. Dumont OP.    Important Medication Changes and Reason:  none    Active or Pending Issues Requiring Follow-up: F/u Dr. Dumont     Advanced Directives:   [x] Full code  [ ] DNR  [ ] Hospice    Discharge Diagnoses:  Spinal Compression Fracture and L1, L3, T12

## 2024-07-07 NOTE — PROGRESS NOTE ADULT - ASSESSMENT
69 yr old female with a pmh of RA, asthma, HTN, HLD who presents with lower back pain following a fall. Admitted for age indeterminate compression fracture at superior endplate of L1.    69 yr old female with a PMH of RA, asthma, HTN, HLD who presents with lower back pain following a fall. Admitted for age indeterminate compression fracture at superior endplate of L1.

## 2024-07-07 NOTE — PROGRESS NOTE ADULT - PROBLEM SELECTOR PLAN 1
CT lumbar spine: Age indeterminate compression fracture at superior endplate of L1.   Remaining CT/xrayimaging with no acute traumatic findings  Neurosurgery consulted: LSO brace will call orthotist for brace. MRI L/s non con  Lidocaine patch, Tylenol prn for mild pain and oxycodone 2.5mg Q6 PRN for moderate pain   PT consult  Plan:   - appreciate PT rec  - awaiting MRI L/S  - consider increasing oxycodone to 5mg CT lumbar spine: Age indeterminate compression fracture at superior endplate of L1.   Remaining CT/xrayimaging with no acute traumatic findings  Neurosurgery consulted: LSO brace will call orthotist for brace. MRI L/s non con  Lidocaine patch, Tylenol prn for mild pain and oxycodone 2.5mg Q6 PRN for moderate pain   PT consult  Plan:   - appreciate PT rec  - awaiting MRI L/S  - c/w oxycodone to 5mg Q6 PRN for moderate pain

## 2024-07-07 NOTE — PROGRESS NOTE ADULT - SUBJECTIVE AND OBJECTIVE BOX
PROGRESS NOTE:     Patient is a 69y old  Female who presents with a chief complaint of  at bedside (06 Jul 2024 09:25)      SUBJECTIVE / OVERNIGHT EVENTS:    OVERNIGHT: No acute overnight events.      Patient was examined at bedside and feels well this morning. Denies fever, chills, chest pain, SOB, nausea, vomiting. ROS otherwise negative and pt is amenable to current treatment plan.      REVIEW OF SYSTEMS:    CONSTITUTIONAL:  No weakness, fevers, or chills  EYES/ENT:  No visual changes, vertigo, or throat pain   NECK:  No pain or stiffness  RESPIRATORY:  No SOB, cough, wheezing, or hemoptysis  CARDIOVASCULAR:  No chest pain or palpitations  GASTROINTESTINAL:  No abdominal pain, nausea, vomiting, or hematemesis; No diarrhea or constipation; No melena or hematochezia.  GENITOURINARY:  No dysuria, change in frequency, or hematuria  NEUROLOGICAL:  No numbness or weakness  SKIN:  No itching or rashes      MEDICATIONS  (STANDING):  atorvastatin 20 milliGRAM(s) Oral at bedtime  enoxaparin Injectable 40 milliGRAM(s) SubCutaneous every 24 hours  famotidine    Tablet 40 milliGRAM(s) Oral at bedtime  folic acid 1 milliGRAM(s) Oral daily  lidocaine   4% Patch 1 Patch Transdermal daily  metoprolol succinate ER 25 milliGRAM(s) Oral with breakfast  montelukast 10 milliGRAM(s) Oral with breakfast  pantoprazole    Tablet 40 milliGRAM(s) Oral before breakfast    MEDICATIONS  (PRN):  acetaminophen     Tablet .. 650 milliGRAM(s) Oral every 6 hours PRN Temp greater or equal to 38C (100.4F), Mild Pain (1 - 3)  albuterol    90 MICROgram(s) HFA Inhaler 2 Puff(s) Inhalation every 6 hours PRN Shortness of Breath and/or Wheezing  aluminum hydroxide/magnesium hydroxide/simethicone Suspension 30 milliLiter(s) Oral every 4 hours PRN Dyspepsia  melatonin 3 milliGRAM(s) Oral at bedtime PRN Insomnia  ondansetron Injectable 4 milliGRAM(s) IV Push every 8 hours PRN Nausea and/or Vomiting  oxyCODONE    IR 5 milliGRAM(s) Oral every 6 hours PRN Moderate Pain (4 - 6)      CAPILLARY BLOOD GLUCOSE        I&O's Summary      PHYSICAL EXAM:  Vital Signs Last 24 Hrs  T(C): 36.9 (06 Jul 2024 21:00), Max: 37.3 (06 Jul 2024 07:30)  T(F): 98.4 (06 Jul 2024 21:00), Max: 99.1 (06 Jul 2024 07:30)  HR: 62 (06 Jul 2024 21:00) (62 - 70)  BP: 111/62 (06 Jul 2024 21:00) (111/62 - 125/69)  BP(mean): --  RR: 18 (06 Jul 2024 21:00) (18 - 18)  SpO2: 97% (06 Jul 2024 21:00) (97% - 99%)    Parameters below as of 06 Jul 2024 21:00  Patient On (Oxygen Delivery Method): room air        CONSTITUTIONAL: NAD; well-developed  HEENT: PERRL, clear conjunctiva  RESPIRATORY: Normal respiratory effort; lungs are clear to auscultation bilaterally; No crackles/rhonchi/wheezing  CARDIOVASCULAR: Regular rate and rhythm, normal S1 and S2, no murmur/rub/gallop; No lower extremity edema; Peripheral pulses are 2+ bilaterally  ABDOMEN: Nontender to palpation, normoactive bowel sounds, no rebound/guarding; No hepatosplenomegaly  MUSCULOSKELETAL: No clubbing or cyanosis of digits; no joint swelling or tenderness to palpation  EXTREMITY: Lower extremities non-tender to palpation; non-erythematous B/L  NEURO: A&Ox3; no focal deficits   PSYCH: Normal mood; affect appropriate    LABS:                        11.8   12.05 )-----------( 216      ( 05 Jul 2024 17:23 )             37.9     07-05    142  |  107  |  13  ----------------------------<  85  4.2   |  23  |  0.77    Ca    8.8      05 Jul 2024 17:23            Urinalysis Basic - ( 05 Jul 2024 17:23 )    Color: x / Appearance: x / SG: x / pH: x  Gluc: 85 mg/dL / Ketone: x  / Bili: x / Urobili: x   Blood: x / Protein: x / Nitrite: x   Leuk Esterase: x / RBC: x / WBC x   Sq Epi: x / Non Sq Epi: x / Bacteria: x          RADIOLOGY & ADDITIONAL TESTS:    N/A   PROGRESS NOTE:     Patient is a 69y old  Female who presents with a chief complaint of lumbar fracture (06 Jul 2024 09:25)      SUBJECTIVE / OVERNIGHT EVENTS:    OVERNIGHT: No acute overnight events.      Patient was examined at bedside and feels well this morning. Denies fever, chills, chest pain, SOB, nausea, vomiting. ROS otherwise negative and pt is amenable to current treatment plan.  Pain better with increased oxycodone 5mg       REVIEW OF SYSTEMS:    CONSTITUTIONAL:  No weakness, fevers, or chills  EYES/ENT:  No visual changes, vertigo, or throat pain   NECK:  No pain or stiffness  RESPIRATORY:  No SOB, cough, wheezing, or hemoptysis  CARDIOVASCULAR:  No chest pain or palpitations  GASTROINTESTINAL:  No abdominal pain, nausea, vomiting, or hematemesis; No diarrhea or constipation; No melena or hematochezia.  GENITOURINARY:  No dysuria, change in frequency, or hematuria  NEUROLOGICAL:  No numbness or weakness  SKIN:  No itching or rashes      MEDICATIONS  (STANDING):  atorvastatin 20 milliGRAM(s) Oral at bedtime  enoxaparin Injectable 40 milliGRAM(s) SubCutaneous every 24 hours  famotidine    Tablet 40 milliGRAM(s) Oral at bedtime  folic acid 1 milliGRAM(s) Oral daily  lidocaine   4% Patch 1 Patch Transdermal daily  metoprolol succinate ER 25 milliGRAM(s) Oral with breakfast  montelukast 10 milliGRAM(s) Oral with breakfast  pantoprazole    Tablet 40 milliGRAM(s) Oral before breakfast    MEDICATIONS  (PRN):  acetaminophen     Tablet .. 650 milliGRAM(s) Oral every 6 hours PRN Temp greater or equal to 38C (100.4F), Mild Pain (1 - 3)  albuterol    90 MICROgram(s) HFA Inhaler 2 Puff(s) Inhalation every 6 hours PRN Shortness of Breath and/or Wheezing  aluminum hydroxide/magnesium hydroxide/simethicone Suspension 30 milliLiter(s) Oral every 4 hours PRN Dyspepsia  melatonin 3 milliGRAM(s) Oral at bedtime PRN Insomnia  ondansetron Injectable 4 milliGRAM(s) IV Push every 8 hours PRN Nausea and/or Vomiting  oxyCODONE    IR 5 milliGRAM(s) Oral every 6 hours PRN Moderate Pain (4 - 6)      CAPILLARY BLOOD GLUCOSE        I&O's Summary      PHYSICAL EXAM:  Vital Signs Last 24 Hrs  T(C): 36.9 (06 Jul 2024 21:00), Max: 37.3 (06 Jul 2024 07:30)  T(F): 98.4 (06 Jul 2024 21:00), Max: 99.1 (06 Jul 2024 07:30)  HR: 62 (06 Jul 2024 21:00) (62 - 70)  BP: 111/62 (06 Jul 2024 21:00) (111/62 - 125/69)  BP(mean): --  RR: 18 (06 Jul 2024 21:00) (18 - 18)  SpO2: 97% (06 Jul 2024 21:00) (97% - 99%)    Parameters below as of 06 Jul 2024 21:00  Patient On (Oxygen Delivery Method): room air        CONSTITUTIONAL: NAD; well-developed  HEENT: PERRL, clear conjunctiva  RESPIRATORY: Normal respiratory effort; lungs are clear to auscultation bilaterally; No crackles/rhonchi/wheezing  CARDIOVASCULAR: Regular rate and rhythm, normal S1 and S2, no murmur/rub/gallop; No lower extremity edema; Peripheral pulses are 2+ bilaterally  ABDOMEN: Nontender to palpation, normoactive bowel sounds, no rebound/guarding; No hepatosplenomegaly  MUSCULOSKELETAL: No clubbing or cyanosis of digits; no joint swelling or tenderness to palpation  EXTREMITY: Lower extremities non-tender to palpation; non-erythematous B/L  NEURO: A&Ox3; no focal deficits   PSYCH: Normal mood; affect appropriate    LABS:                        11.8   12.05 )-----------( 216      ( 05 Jul 2024 17:23 )             37.9     07-05    142  |  107  |  13  ----------------------------<  85  4.2   |  23  |  0.77    Ca    8.8      05 Jul 2024 17:23            Urinalysis Basic - ( 05 Jul 2024 17:23 )    Color: x / Appearance: x / SG: x / pH: x  Gluc: 85 mg/dL / Ketone: x  / Bili: x / Urobili: x   Blood: x / Protein: x / Nitrite: x   Leuk Esterase: x / RBC: x / WBC x   Sq Epi: x / Non Sq Epi: x / Bacteria: x          RADIOLOGY & ADDITIONAL TESTS:    N/A

## 2024-07-07 NOTE — DISCHARGE NOTE PROVIDER - NSDCCPCAREPLAN_GEN_ALL_CORE_FT
PRINCIPAL DISCHARGE DIAGNOSIS  Diagnosis: Spinal compression fracture  Assessment and Plan of Treatment: You presented to the hospital after falling down 4-5 steps of stairs. We got a CAT scan of your spinal that shows fracture of the first Lumbar vertebral. Neurosurgery was consulted to assist with manageement. The recommended a brace to support your back. Additionally, MRI was taken that showed additional acute fracture of 3rd lumbar vertebrae and 12th thoracic vertebrae without ligametous injury. In your case, you had the neurological symptoms associated with your injury, so surgery was not needed. We are discharing you with oxycodone for pain relief as needed. You also need to follow up with Dr. Dumont outpatient to continue monitor. If you starts experiencing tingling sensation in your legs, loss of strength, and losing urinary and bowel control, please return to the hospital. Additionally, because of your rheumatoid arthritis diagnosis, and history of steroid use, please follow up with your Primary care physician for DEXA scan and assess need for osteoporosis therapy.  Follow up appointment:  1: Neurosurgery with Dr. Dumont for Outpatient followup 1-2 weeks  2: PCP within 1-2 weeks

## 2024-07-08 LAB
ALBUMIN SERPL ELPH-MCNC: 3.7 G/DL — SIGNIFICANT CHANGE UP (ref 3.3–5)
ALP SERPL-CCNC: 110 U/L — SIGNIFICANT CHANGE UP (ref 40–120)
ALT FLD-CCNC: 11 U/L — SIGNIFICANT CHANGE UP (ref 4–33)
ANION GAP SERPL CALC-SCNC: 10 MMOL/L — SIGNIFICANT CHANGE UP (ref 7–14)
AST SERPL-CCNC: 15 U/L — SIGNIFICANT CHANGE UP (ref 4–32)
BILIRUB SERPL-MCNC: 0.9 MG/DL — SIGNIFICANT CHANGE UP (ref 0.2–1.2)
BUN SERPL-MCNC: 12 MG/DL — SIGNIFICANT CHANGE UP (ref 7–23)
CALCIUM SERPL-MCNC: 9.1 MG/DL — SIGNIFICANT CHANGE UP (ref 8.4–10.5)
CHLORIDE SERPL-SCNC: 106 MMOL/L — SIGNIFICANT CHANGE UP (ref 98–107)
CO2 SERPL-SCNC: 23 MMOL/L — SIGNIFICANT CHANGE UP (ref 22–31)
CREAT SERPL-MCNC: 0.75 MG/DL — SIGNIFICANT CHANGE UP (ref 0.5–1.3)
EGFR: 86 ML/MIN/1.73M2 — SIGNIFICANT CHANGE UP
GLUCOSE SERPL-MCNC: 95 MG/DL — SIGNIFICANT CHANGE UP (ref 70–99)
HCT VFR BLD CALC: 35.9 % — SIGNIFICANT CHANGE UP (ref 34.5–45)
HGB BLD-MCNC: 11.7 G/DL — SIGNIFICANT CHANGE UP (ref 11.5–15.5)
MAGNESIUM SERPL-MCNC: 2 MG/DL — SIGNIFICANT CHANGE UP (ref 1.6–2.6)
MCHC RBC-ENTMCNC: 24.2 PG — LOW (ref 27–34)
MCHC RBC-ENTMCNC: 32.6 GM/DL — SIGNIFICANT CHANGE UP (ref 32–36)
MCV RBC AUTO: 74.2 FL — LOW (ref 80–100)
NRBC # BLD: 0 /100 WBCS — SIGNIFICANT CHANGE UP (ref 0–0)
NRBC # FLD: 0 K/UL — SIGNIFICANT CHANGE UP (ref 0–0)
PHOSPHATE SERPL-MCNC: 3.7 MG/DL — SIGNIFICANT CHANGE UP (ref 2.5–4.5)
PLATELET # BLD AUTO: 200 K/UL — SIGNIFICANT CHANGE UP (ref 150–400)
POTASSIUM SERPL-MCNC: 3.9 MMOL/L — SIGNIFICANT CHANGE UP (ref 3.5–5.3)
POTASSIUM SERPL-SCNC: 3.9 MMOL/L — SIGNIFICANT CHANGE UP (ref 3.5–5.3)
PROT SERPL-MCNC: 7 G/DL — SIGNIFICANT CHANGE UP (ref 6–8.3)
RBC # BLD: 4.84 M/UL — SIGNIFICANT CHANGE UP (ref 3.8–5.2)
RBC # FLD: 15.9 % — HIGH (ref 10.3–14.5)
SODIUM SERPL-SCNC: 139 MMOL/L — SIGNIFICANT CHANGE UP (ref 135–145)
WBC # BLD: 7.6 K/UL — SIGNIFICANT CHANGE UP (ref 3.8–10.5)
WBC # FLD AUTO: 7.6 K/UL — SIGNIFICANT CHANGE UP (ref 3.8–10.5)

## 2024-07-08 PROCEDURE — 72148 MRI LUMBAR SPINE W/O DYE: CPT | Mod: 26

## 2024-07-08 PROCEDURE — 99233 SBSQ HOSP IP/OBS HIGH 50: CPT | Mod: GC

## 2024-07-08 RX ADMIN — Medication 1 MILLIGRAM(S): at 12:58

## 2024-07-08 RX ADMIN — PANTOPRAZOLE SODIUM 40 MILLIGRAM(S): 40 INJECTION, POWDER, FOR SOLUTION INTRAVENOUS at 06:39

## 2024-07-08 RX ADMIN — Medication 3 MILLIGRAM(S): at 21:23

## 2024-07-08 RX ADMIN — Medication 1 TABLET(S): at 12:58

## 2024-07-08 RX ADMIN — LIDOCAINE HCL 1 PATCH: 28 GEL TOPICAL at 12:59

## 2024-07-08 RX ADMIN — ENOXAPARIN SODIUM 40 MILLIGRAM(S): 100 INJECTION SUBCUTANEOUS at 21:19

## 2024-07-08 RX ADMIN — Medication 25 MILLIGRAM(S): at 09:38

## 2024-07-08 RX ADMIN — MONTELUKAST SODIUM 10 MILLIGRAM(S): 10 TABLET, FILM COATED ORAL at 09:38

## 2024-07-08 RX ADMIN — ATORVASTATIN CALCIUM 20 MILLIGRAM(S): 20 TABLET, FILM COATED ORAL at 21:18

## 2024-07-08 RX ADMIN — METHOTREXATE 15 MILLIGRAM(S): 25 INJECTION, SOLUTION SUBCUTANEOUS at 06:01

## 2024-07-08 RX ADMIN — Medication 400 UNIT(S): at 12:58

## 2024-07-08 RX ADMIN — Medication 40 MILLIGRAM(S): at 21:18

## 2024-07-08 NOTE — PROGRESS NOTE ADULT - SUBJECTIVE AND OBJECTIVE BOX
Progress Note    07-05-24 (3d)    Patient is a 69y old  Female who presents with a chief complaint of  at bedside (07 Jul 2024 13:01)      Subjective / Overnight Events :  - No acute events overnight.  - Pt seen and examined at bedside.     Additional ROS (if any):    MEDICATIONS  (STANDING):  atorvastatin 20 milliGRAM(s) Oral at bedtime  calcium carbonate 1250 mG  + Vitamin D (OsCal 500 + D) 1 Tablet(s) Oral daily  cholecalciferol 400 Unit(s) Oral daily  enoxaparin Injectable 40 milliGRAM(s) SubCutaneous every 24 hours  famotidine    Tablet 40 milliGRAM(s) Oral at bedtime  folic acid 1 milliGRAM(s) Oral daily  lidocaine   4% Patch 1 Patch Transdermal daily  methotrexate 15 milliGRAM(s) Oral <User Schedule>  metoprolol succinate ER 25 milliGRAM(s) Oral with breakfast  montelukast 10 milliGRAM(s) Oral with breakfast  pantoprazole    Tablet 40 milliGRAM(s) Oral before breakfast    MEDICATIONS  (PRN):  acetaminophen     Tablet .. 650 milliGRAM(s) Oral every 6 hours PRN Temp greater or equal to 38C (100.4F), Mild Pain (1 - 3)  albuterol    90 MICROgram(s) HFA Inhaler 2 Puff(s) Inhalation every 6 hours PRN Shortness of Breath and/or Wheezing  aluminum hydroxide/magnesium hydroxide/simethicone Suspension 30 milliLiter(s) Oral every 4 hours PRN Dyspepsia  melatonin 3 milliGRAM(s) Oral at bedtime PRN Insomnia  ondansetron Injectable 4 milliGRAM(s) IV Push every 8 hours PRN Nausea and/or Vomiting  oxyCODONE    IR 5 milliGRAM(s) Oral every 6 hours PRN Moderate Pain (4 - 6)      CAPILLARY BLOOD GLUCOSE        I&O's Summary      PHYSICAL EXAM:  Vital Signs Last 24 Hrs  T(C): 36.8 (08 Jul 2024 06:34), Max: 37.3 (07 Jul 2024 18:39)  T(F): 98.3 (08 Jul 2024 06:34), Max: 99.1 (07 Jul 2024 18:39)  HR: 73 (08 Jul 2024 06:34) (73 - 76)  BP: 116/63 (08 Jul 2024 06:34) (116/63 - 119/80)  BP(mean): --  RR: 18 (08 Jul 2024 06:34) (18 - 18)  SpO2: 99% (08 Jul 2024 06:34) (98% - 99%)    Parameters below as of 08 Jul 2024 06:34  Patient On (Oxygen Delivery Method): room air        General: NAD, non-toxic appearing   HEENT: PERRLA, EOMi, no scleral icterus  CV: RRR, normal S1 and S2, no m/r/g  Lungs: normal respiratory effort. CTAB, no wheezes, rales, or rhonchi  Abd: soft, nontender, nondistended  Ext: no edema, 2+ peripheral pulses   Pysch: AAOx3, appropriate affect   Neuro: grossly non-focal, moving all extremities spontaneously   Skin: no rashes or lesions     LABS:                          11.7   7.60  )-----------( 200      ( 08 Jul 2024 06:42 )             35.9       WBC Trend: 7.60<--, 12.05<--  Hb Trend: 11.7<--, 11.8<--                        RADIOLOGY & ADDITIONAL TESTS: Reviewed Progress Note    07-05-24 (3d)    Patient is a 69y old  Female who presents with a chief complaint of  at bedside (07 Jul 2024 13:01)      Subjective / Overnight Events :  - No acute events overnight.  - pt pain controlled. no FND, no signs of cord compression.  - Pt seen and examined at bedside.     Additional ROS (if any):    MEDICATIONS  (STANDING):  atorvastatin 20 milliGRAM(s) Oral at bedtime  calcium carbonate 1250 mG  + Vitamin D (OsCal 500 + D) 1 Tablet(s) Oral daily  cholecalciferol 400 Unit(s) Oral daily  enoxaparin Injectable 40 milliGRAM(s) SubCutaneous every 24 hours  famotidine    Tablet 40 milliGRAM(s) Oral at bedtime  folic acid 1 milliGRAM(s) Oral daily  lidocaine   4% Patch 1 Patch Transdermal daily  methotrexate 15 milliGRAM(s) Oral <User Schedule>  metoprolol succinate ER 25 milliGRAM(s) Oral with breakfast  montelukast 10 milliGRAM(s) Oral with breakfast  pantoprazole    Tablet 40 milliGRAM(s) Oral before breakfast    MEDICATIONS  (PRN):  acetaminophen     Tablet .. 650 milliGRAM(s) Oral every 6 hours PRN Temp greater or equal to 38C (100.4F), Mild Pain (1 - 3)  albuterol    90 MICROgram(s) HFA Inhaler 2 Puff(s) Inhalation every 6 hours PRN Shortness of Breath and/or Wheezing  aluminum hydroxide/magnesium hydroxide/simethicone Suspension 30 milliLiter(s) Oral every 4 hours PRN Dyspepsia  melatonin 3 milliGRAM(s) Oral at bedtime PRN Insomnia  ondansetron Injectable 4 milliGRAM(s) IV Push every 8 hours PRN Nausea and/or Vomiting  oxyCODONE    IR 5 milliGRAM(s) Oral every 6 hours PRN Moderate Pain (4 - 6)      CAPILLARY BLOOD GLUCOSE        I&O's Summary      PHYSICAL EXAM:  Vital Signs Last 24 Hrs  T(C): 36.8 (08 Jul 2024 06:34), Max: 37.3 (07 Jul 2024 18:39)  T(F): 98.3 (08 Jul 2024 06:34), Max: 99.1 (07 Jul 2024 18:39)  HR: 73 (08 Jul 2024 06:34) (73 - 76)  BP: 116/63 (08 Jul 2024 06:34) (116/63 - 119/80)  BP(mean): --  RR: 18 (08 Jul 2024 06:34) (18 - 18)  SpO2: 99% (08 Jul 2024 06:34) (98% - 99%)    Parameters below as of 08 Jul 2024 06:34  Patient On (Oxygen Delivery Method): room air        General: NAD, non-toxic appearing   HEENT:  EOMi, no scleral icterus  CV: RRR, normal S1 and S2, no m/r/g  Lungs: normal respiratory effort. CTAB, no wheezes, rales, or rhonchi  Abd: soft, nontender, nondistended  Ext: no edema, 2+ peripheral pulses   Pysch: AAOx3, appropriate affect   Neuro: grossly non-focal, moving all extremities spontaneously   Skin: no rashes or lesions     LABS:                          11.7   7.60  )-----------( 200      ( 08 Jul 2024 06:42 )             35.9       WBC Trend: 7.60<--, 12.05<--  Hb Trend: 11.7<--, 11.8<--                        RADIOLOGY & ADDITIONAL TESTS: Reviewed

## 2024-07-08 NOTE — PROGRESS NOTE ADULT - PROBLEM SELECTOR PLAN 1
CT lumbar spine: Age indeterminate compression fracture at superior endplate of L1.   Remaining CT/xrayimaging with no acute traumatic findings  Neurosurgery consulted: LSO brace will call orthotist for brace. MRI L/s non con  Lidocaine patch, Tylenol prn for mild pain and oxycodone 2.5mg Q6 PRN for moderate pain   PT consult  Plan:   - appreciate PT rec  - awaiting MRI L/S  - c/w oxycodone to 5mg Q6 PRN for moderate pain CT lumbar spine: Age indeterminate compression fracture at superior endplate of L1.   Remaining CT/xrayimaging with no acute traumatic findings  Neurosurgery consulted: LSO brace will call orthotist for brace. MRI L/s non con  Lidocaine patch, Tylenol prn for mild pain and oxycodone 2.5mg Q6 PRN for moderate pain   PT consult  Plan:   - appreciate PT rec  - neurosurgery recs  - c/w oxycodone to 5mg Q6 PRN for moderate pain

## 2024-07-08 NOTE — PROGRESS NOTE ADULT - ASSESSMENT
69 yr old female with a PMH of RA, asthma, HTN, HLD who presents with lower back pain following a fall. Admitted for age indeterminate compression fracture at superior endplate of L1.    69 yr old female with a PMH of RA, asthma, HTN, HLD who presents with lower back pain following a fall. Admitted for age indeterminate compression fracture at superior endplate of L1.     MRI showed: Redemonstration of moderate compression deformity of superior endplate of   L1. Edema within the subjacent vertebral body suggests acuity of   fracture. Minimal superior endplate compression deformity of L3 with associated   subjacent edema suggesting acuity of fracture. No retropulsed bone. Edema and approximating the endplate of T12, suggesting the presence of   occult fracture. No loss of vertebral body height or retropulsed bone. Degenerative changes as detailed in the body the report.

## 2024-07-09 ENCOUNTER — TRANSCRIPTION ENCOUNTER (OUTPATIENT)
Age: 69
End: 2024-07-09

## 2024-07-09 VITALS
DIASTOLIC BLOOD PRESSURE: 61 MMHG | RESPIRATION RATE: 18 BRPM | HEART RATE: 72 BPM | SYSTOLIC BLOOD PRESSURE: 117 MMHG | OXYGEN SATURATION: 100 % | TEMPERATURE: 98 F

## 2024-07-09 PROCEDURE — 99239 HOSP IP/OBS DSCHRG MGMT >30: CPT | Mod: GC

## 2024-07-09 RX ORDER — OXYCODONE HYDROCHLORIDE 100 MG/5ML
1 SOLUTION ORAL
Qty: 0 | Refills: 0 | DISCHARGE
Start: 2024-07-09

## 2024-07-09 RX ADMIN — LIDOCAINE HCL 1 PATCH: 28 GEL TOPICAL at 00:14

## 2024-07-09 RX ADMIN — PANTOPRAZOLE SODIUM 40 MILLIGRAM(S): 40 INJECTION, POWDER, FOR SOLUTION INTRAVENOUS at 07:05

## 2024-07-09 RX ADMIN — MONTELUKAST SODIUM 10 MILLIGRAM(S): 10 TABLET, FILM COATED ORAL at 07:04

## 2024-07-09 RX ADMIN — Medication 25 MILLIGRAM(S): at 07:05

## 2024-07-09 NOTE — PROGRESS NOTE ADULT - ASSESSMENT
69 yr old female with a PMH of RA, asthma, HTN, HLD who presents with lower back pain following a fall. Admitted for age indeterminate compression fracture at superior endplate of L1.     MRI showed: Redemonstration of moderate compression deformity of superior endplate of   L1. Edema within the subjacent vertebral body suggests acuity of   fracture. Minimal superior endplate compression deformity of L3 with associated   subjacent edema suggesting acuity of fracture. No retropulsed bone. Edema and approximating the endplate of T12, suggesting the presence of   occult fracture. No loss of vertebral body height or retropulsed bone. Degenerative changes as detailed in the body the report.

## 2024-07-09 NOTE — PROGRESS NOTE ADULT - SUBJECTIVE AND OBJECTIVE BOX
Progress Note    07-05-24 (4d)    Patient is a 69y old  Female who presents with a chief complaint of mechanical call concerning for compression fracture of the spine. (08 Jul 2024 07:46)      Subjective / Overnight Events :  - No acute events overnight.  - Pt seen and examined at bedside.     Additional ROS (if any):    MEDICATIONS  (STANDING):  atorvastatin 20 milliGRAM(s) Oral at bedtime  calcium carbonate 1250 mG  + Vitamin D (OsCal 500 + D) 1 Tablet(s) Oral daily  cholecalciferol 400 Unit(s) Oral daily  enoxaparin Injectable 40 milliGRAM(s) SubCutaneous every 24 hours  famotidine    Tablet 40 milliGRAM(s) Oral at bedtime  folic acid 1 milliGRAM(s) Oral daily  lidocaine   4% Patch 1 Patch Transdermal daily  methotrexate 15 milliGRAM(s) Oral <User Schedule>  metoprolol succinate ER 25 milliGRAM(s) Oral with breakfast  montelukast 10 milliGRAM(s) Oral with breakfast  pantoprazole    Tablet 40 milliGRAM(s) Oral before breakfast    MEDICATIONS  (PRN):  acetaminophen     Tablet .. 650 milliGRAM(s) Oral every 6 hours PRN Temp greater or equal to 38C (100.4F), Mild Pain (1 - 3)  albuterol    90 MICROgram(s) HFA Inhaler 2 Puff(s) Inhalation every 6 hours PRN Shortness of Breath and/or Wheezing  aluminum hydroxide/magnesium hydroxide/simethicone Suspension 30 milliLiter(s) Oral every 4 hours PRN Dyspepsia  melatonin 3 milliGRAM(s) Oral at bedtime PRN Insomnia  ondansetron Injectable 4 milliGRAM(s) IV Push every 8 hours PRN Nausea and/or Vomiting  oxyCODONE    IR 5 milliGRAM(s) Oral every 6 hours PRN Moderate Pain (4 - 6)      CAPILLARY BLOOD GLUCOSE        I&O's Summary      PHYSICAL EXAM:  Vital Signs Last 24 Hrs  T(C): 36.7 (09 Jul 2024 01:53), Max: 36.8 (08 Jul 2024 21:15)  T(F): 98 (09 Jul 2024 01:53), Max: 98.2 (08 Jul 2024 21:15)  HR: 67 (09 Jul 2024 01:53) (59 - 72)  BP: 111/59 (09 Jul 2024 01:53) (111/59 - 130/50)  BP(mean): --  RR: 18 (09 Jul 2024 01:53) (18 - 18)  SpO2: 100% (09 Jul 2024 01:53) (100% - 100%)    Parameters below as of 09 Jul 2024 01:53  Patient On (Oxygen Delivery Method): room air        General: NAD, non-toxic appearing   HEENT: PERRLA, EOMi, no scleral icterus  CV: RRR, normal S1 and S2, no m/r/g  Lungs: normal respiratory effort. CTAB, no wheezes, rales, or rhonchi  Abd: soft, nontender, nondistended  Ext: no edema, 2+ peripheral pulses   Pysch: AAOx3, appropriate affect   Neuro: grossly non-focal, moving all extremities spontaneously   Skin: no rashes or lesions     LABS:                          11.7   7.60  )-----------( 200      ( 08 Jul 2024 06:42 )             35.9       WBC Trend: 7.60<--, 12.05<--  Hb Trend: 11.7<--, 11.8<--    07-08    139  |  106  |  12  ----------------------------<  95  3.9   |  23  |  0.75    Ca    9.1      08 Jul 2024 06:42  Phos  3.7     07-08  Mg     2.00     07-08    TPro  7.0  /  Alb  3.7  /  TBili  0.9  /  DBili  x   /  AST  15  /  ALT  11  /  AlkPhos  110  07-08          Urinalysis Basic - ( 08 Jul 2024 06:42 )    Color: x / Appearance: x / SG: x / pH: x  Gluc: 95 mg/dL / Ketone: x  / Bili: x / Urobili: x   Blood: x / Protein: x / Nitrite: x   Leuk Esterase: x / RBC: x / WBC x   Sq Epi: x / Non Sq Epi: x / Bacteria: x            RADIOLOGY & ADDITIONAL TESTS: Reviewed

## 2024-07-09 NOTE — PROGRESS NOTE ADULT - PROBLEM SELECTOR PLAN 1
CT lumbar spine: Age indeterminate compression fracture at superior endplate of L1.   Remaining CT/xrayimaging with no acute traumatic findings  Neurosurgery consulted: LSO brace will call orthotist for brace. MRI L/s non con  Lidocaine patch, Tylenol prn for mild pain and oxycodone 2.5mg Q6 PRN for moderate pain   PT consult  Plan:   - appreciate PT rec  - neurosurgery recs  - c/w oxycodone to 5mg Q6 PRN for moderate pain

## 2024-07-09 NOTE — PROGRESS NOTE ADULT - PROBLEM SELECTOR PLAN 3
Chronic stable  Continue atorvastatin 20mg daily   Plan:   - lipid panel pending collection

## 2024-07-09 NOTE — PROGRESS NOTE ADULT - ATTENDING COMMENTS
69F with underline osteopenia who presented with back pain and T12,L1 endplate facture after a fall. evaluated by surgery, no surgical intervention. her pain is controlled. stable for d/c home.   recommended outpatient follow up with PCP and her rheumatologist to discuss osteoporosis treatments. patient endorsed understanding.     d/c home today.    35 minutes spent coordinating discharge
69 yr old female with a pmh of RA, asthma, HTN, HLD who presents with lower back pain following a mechanical fall found to have an L1 fracture.     Patient reports pain with movement. Awaiting MRI L/S spine, NSGY following   Informed patient and  of incidental pulmonary nodule and cholelithiasis findings. Recc OP follow up
69 yr old female with a pmh of RA, asthma, HTN, HLD who presents with lower back pain following a mechanical fall found to have an L1 fracture.     Patient reports pain with movement. Awaiting MRI L/S spine, NSGY following   PT c/s pending   Informed patient and  of incidental pulmonary nodule and cholelithiasis findings. Recc OP follow up.
69F with h/o RA was on prednisone, recently switched to MTX presented with persistent back pain after fall. CT showed L1 endplate compression fx. per discussion with patient, her rheumatologist has stated patient has underline osteopenia and was recommended to start a medication to improve bone density, but she was hesitant due to side-effect. had extensive discussion with patient regarding the fx likely suggestive of osteoporosis and the medication her rheumatologist had suggested would be beneficial to help reduce risk of fx in the future. Patient reports understand.   At this time, vss, labs were unremarkable. she has point tenderness on palpation of midline lumbar spine. she endorsed pain relief with TLSO. PT eval pending. MRI L spine being performed today. f/u neurosurgery after MRI, doubt any surgical intervention would be warranted iso lack of neurologic symptoms. d/c planning.

## 2024-07-09 NOTE — DISCHARGE NOTE NURSING/CASE MANAGEMENT/SOCIAL WORK - NSDCFUADDAPPT_GEN_ALL_CORE_FT
APPTS ARE READY TO BE MADE: [x] YES    Best Family or Patient Contact (if needed):    Additional Information about above appointments (if needed):    1: Neurosurgery with Dr. Dumont for Outpatient followup 1-2 weeks  2: PCP within 1-2 weeks  3:     Other comments or requests:

## 2024-07-09 NOTE — DISCHARGE NOTE NURSING/CASE MANAGEMENT/SOCIAL WORK - PATIENT PORTAL LINK FT
You can access the FollowMyHealth Patient Portal offered by [facility] by registering at the following website: [email or web address]. By joining [facility]’s FollowMyHealth portal, you will also be able to view your health information using other applications (apps) compatible with our system.

## 2024-07-10 PROBLEM — I10 ESSENTIAL (PRIMARY) HYPERTENSION: Chronic | Status: ACTIVE | Noted: 2024-07-06

## 2024-07-10 PROBLEM — J45.909 UNSPECIFIED ASTHMA, UNCOMPLICATED: Chronic | Status: ACTIVE | Noted: 2024-07-06

## 2024-07-10 PROBLEM — E78.5 HYPERLIPIDEMIA, UNSPECIFIED: Chronic | Status: ACTIVE | Noted: 2024-07-06

## 2024-07-10 PROBLEM — M06.9 RHEUMATOID ARTHRITIS, UNSPECIFIED: Chronic | Status: ACTIVE | Noted: 2024-07-06

## 2024-08-23 ENCOUNTER — NON-APPOINTMENT (OUTPATIENT)
Age: 69
End: 2024-08-23

## 2024-10-09 PROBLEM — S32.010D COMPRESSION FRACTURE OF L1 VERTEBRA WITH ROUTINE HEALING, SUBSEQUENT ENCOUNTER: Status: ACTIVE | Noted: 2024-10-09

## 2024-10-09 NOTE — CHART NOTE - NSCHARTNOTEFT_GEN_A_CORE
Briefly, 69 yr old female with a PMH of RA (on MTX 15 mg weekly), asthma, HTN, HLD who presents with lower back pain following a fall. She is admitted for age indeterminate compression fracture at superior endplate of L1. Rheumatology consulted regarding her RA medications. Please continue with methotrexate 15 mg weekly and folic acid 1 mg daily. please hold off starting methotrexate if she is scheduled for spinal surgery, and restart it one week after the surgery.     Katia Echeverria MD  Rheumatology Fellow
Patient was outreached but did not answer. A voicemail was left for the patient to return our call. [phone]
Previously Declined (within the last year)

## 2024-10-10 ENCOUNTER — APPOINTMENT (OUTPATIENT)
Dept: NEUROSURGERY | Facility: CLINIC | Age: 69
End: 2024-10-10

## 2024-10-10 DIAGNOSIS — S32.010D WEDGE COMPRESSION FRACTURE OF FIRST LUMBAR VERTEBRA, SUBSEQUENT ENCOUNTER FOR FRACTURE WITH ROUTINE HEALING: ICD-10-CM
